# Patient Record
Sex: FEMALE | Race: OTHER | Employment: FULL TIME | URBAN - METROPOLITAN AREA
[De-identification: names, ages, dates, MRNs, and addresses within clinical notes are randomized per-mention and may not be internally consistent; named-entity substitution may affect disease eponyms.]

---

## 2017-08-23 ENCOUNTER — HOSPITAL ENCOUNTER (OUTPATIENT)
Dept: MAMMOGRAPHY | Age: 47
Discharge: HOME | End: 2017-08-23
Attending: OBSTETRICS & GYNECOLOGY
Payer: COMMERCIAL

## 2017-08-23 ENCOUNTER — TELEPHONE (OUTPATIENT)
Dept: OBGYN CLINIC | Facility: CLINIC | Age: 47
End: 2017-08-23

## 2017-08-23 ENCOUNTER — OFFICE VISIT (OUTPATIENT)
Dept: OBGYN CLINIC | Facility: CLINIC | Age: 47
End: 2017-08-23

## 2017-08-23 VITALS
SYSTOLIC BLOOD PRESSURE: 110 MMHG | DIASTOLIC BLOOD PRESSURE: 70 MMHG | BODY MASS INDEX: 31.01 KG/M2 | WEIGHT: 175 LBS | HEIGHT: 63 IN

## 2017-08-23 DIAGNOSIS — Z01.419 WOMEN'S ANNUAL ROUTINE GYNECOLOGICAL EXAMINATION: ICD-10-CM

## 2017-08-23 DIAGNOSIS — Z01.419 WOMEN'S ANNUAL ROUTINE GYNECOLOGICAL EXAMINATION: Primary | ICD-10-CM

## 2017-08-23 PROBLEM — N91.1 AMENORRHEA, SECONDARY: Status: ACTIVE | Noted: 2017-08-23

## 2017-08-23 PROBLEM — Z98.82 HISTORY OF BILATERAL SALINE BREAST IMPLANTS: Status: ACTIVE | Noted: 2017-08-23

## 2017-08-23 PROCEDURE — 87624 HPV HI-RISK TYP POOLED RSLT: CPT | Performed by: OBSTETRICS & GYNECOLOGY

## 2017-08-23 PROCEDURE — 88175 CYTOPATH C/V AUTO FLUID REDO: CPT | Performed by: OBSTETRICS & GYNECOLOGY

## 2017-08-23 PROCEDURE — 99396 PREV VISIT EST AGE 40-64: CPT | Performed by: OBSTETRICS & GYNECOLOGY

## 2017-08-23 PROCEDURE — 77067 SCR MAMMO BI INCL CAD: CPT | Performed by: OBSTETRICS & GYNECOLOGY

## 2017-08-23 RX ORDER — MONTELUKAST SODIUM 10 MG/1
TABLET ORAL
Refills: 1 | COMMUNITY
Start: 2017-07-22 | End: 2018-09-12

## 2017-08-23 NOTE — PROGRESS NOTES
[facility] is here for a checkup. Patient has not had a period since November of last year. She is having hot flashes, vaginal dryness menopausal symptoms. She is coping with these symptoms very well and has no desire to go on hormone replacement therapy.   I history.     Social History       Social History  Social History   Marital status:   Spouse name: N/A    Years of education: N/A  Number of children: N/A     Occupational History  None on file     Social History Main Topics   Smoking status: Never Sm

## 2017-08-24 LAB — HPV I/H RISK 1 DNA SPEC QL NAA+PROBE: NEGATIVE

## 2017-09-05 ENCOUNTER — HOSPITAL ENCOUNTER (OUTPATIENT)
Age: 47
Discharge: HOME | End: 2017-09-05
Attending: FAMILY MEDICINE
Payer: COMMERCIAL

## 2017-09-05 VITALS
RESPIRATION RATE: 22 BRPM | OXYGEN SATURATION: 99 % | TEMPERATURE: 99 F | SYSTOLIC BLOOD PRESSURE: 138 MMHG | HEART RATE: 89 BPM | DIASTOLIC BLOOD PRESSURE: 92 MMHG

## 2017-09-05 DIAGNOSIS — R21 RASH: Primary | ICD-10-CM

## 2017-09-05 PROCEDURE — 99203 OFFICE O/P NEW LOW 30 MIN: CPT

## 2017-09-05 PROCEDURE — 99213 OFFICE O/P EST LOW 20 MIN: CPT

## 2017-09-05 RX ORDER — HYDROXYZINE HYDROCHLORIDE 25 MG/1
25 TABLET, FILM COATED ORAL EVERY 8 HOURS PRN
Qty: 20 TABLET | Refills: 0 | Status: SHIPPED | OUTPATIENT
Start: 2017-09-05 | End: 2017-10-05

## 2017-09-05 RX ORDER — CIPROFLOXACIN 500 MG/1
500 TABLET, FILM COATED ORAL 2 TIMES DAILY
Qty: 14 TABLET | Refills: 0 | Status: SHIPPED | OUTPATIENT
Start: 2017-09-05 | End: 2017-09-12

## 2017-09-05 NOTE — ED INITIAL ASSESSMENT (HPI)
Patient comes in with complaint of a rash to bilateral ankles which is now spreading up both legs. Rash is reddened, raised, itchy. It has been present for the past few days and worsening.

## 2017-09-05 NOTE — ED PROVIDER NOTES
Patient Seen in: [address]    History   Patient presents with:  Rash Skin Problem (integumentary)    Stated Complaint: rash    HPI  65-year-old female presents with a bilateral itchy rash on the lower legs.   Started shruthi rate, regular rhythm and normal heart sounds. Pulmonary/Chest: Effort normal and breath sounds normal. No respiratory distress. She has no wheezes. She has no rales. Skin: Skin is warm.  Rash (b/l ankles and distal shins with papular lesions on erythem

## 2018-03-12 ENCOUNTER — OFFICE VISIT (OUTPATIENT)
Dept: FAMILY MEDICINE CLINIC | Facility: CLINIC | Age: 48
End: 2018-03-12

## 2018-03-12 VITALS
WEIGHT: 178 LBS | HEART RATE: 90 BPM | OXYGEN SATURATION: 98 % | HEIGHT: 62 IN | SYSTOLIC BLOOD PRESSURE: 118 MMHG | DIASTOLIC BLOOD PRESSURE: 76 MMHG | BODY MASS INDEX: 32.76 KG/M2

## 2018-03-12 DIAGNOSIS — F32.A MODERATELY SEVERE DEPRESSION: ICD-10-CM

## 2018-03-12 DIAGNOSIS — R53.83 FATIGUE, UNSPECIFIED TYPE: ICD-10-CM

## 2018-03-12 DIAGNOSIS — R63.5 WEIGHT GAIN: ICD-10-CM

## 2018-03-12 DIAGNOSIS — Z91.89 ENCOUNTER FOR HEPATITIS C VIRUS SCREENING TEST FOR HIGH RISK PATIENT: ICD-10-CM

## 2018-03-12 DIAGNOSIS — F17.200 SMOKING: ICD-10-CM

## 2018-03-12 DIAGNOSIS — Z11.59 ENCOUNTER FOR HEPATITIS C VIRUS SCREENING TEST FOR HIGH RISK PATIENT: ICD-10-CM

## 2018-03-12 DIAGNOSIS — Z00.01 ENCOUNTER FOR ROUTINE ADULT HEALTH EXAMINATION WITH ABNORMAL FINDINGS: Primary | ICD-10-CM

## 2018-03-12 DIAGNOSIS — N95.0 POSTMENOPAUSAL BLEEDING: ICD-10-CM

## 2018-03-12 PROCEDURE — 99386 PREV VISIT NEW AGE 40-64: CPT | Performed by: FAMILY MEDICINE

## 2018-03-12 PROCEDURE — G0438 PPPS, INITIAL VISIT: HCPCS | Performed by: FAMILY MEDICINE

## 2018-03-12 NOTE — PROGRESS NOTES
HPI:   Jaime Kumar is a 49year old female who presents for a complete physical exam.     Previous PCP was Dr. Angelica Randhawa in [place].    Has taken Sertraline 50 mg daily for depression for the past 10 years, which has helped in the past but not working anxiety, no SI or HI            Current Outpatient Prescriptions:  Sertraline HCl 50 MG Oral Tab Take 100 mg by mouth daily.   Disp:  Rfl:    Montelukast Sodium 10 MG Oral Tab  Disp:  Rfl: 1       Aspirin                 Swelling   Past Medical History:   D gynecologist soon   EXTREMITIES: no edema    No results found for: CHOLEST, HDL, LDL, TRIGLY   ASSESSMENT AND PLAN:   Roxanne Wise is a 49year old female who presents for a complete physical exam.  Encounter for routine adult health examination wit year for next WWE or sooner as needed.     Meds & Refills for this Visit:    No prescriptions requested or ordered in this encounter       Imaging & Consults:  HYPNOSIS THERAPY - INTERNAL REFERRAL    Alfredo Leon DO  3/12/2018  12:15 PM

## 2018-03-13 ENCOUNTER — OFFICE VISIT (OUTPATIENT)
Dept: OBGYN CLINIC | Facility: CLINIC | Age: 48
End: 2018-03-13

## 2018-03-13 ENCOUNTER — LAB ENCOUNTER (OUTPATIENT)
Dept: LAB | Facility: REFERENCE LAB | Age: 48
End: 2018-03-13
Attending: OBSTETRICS & GYNECOLOGY
Payer: COMMERCIAL

## 2018-03-13 DIAGNOSIS — N95.0 POSTMENOPAUSAL BLEEDING: ICD-10-CM

## 2018-03-13 DIAGNOSIS — R53.83 FATIGUE, UNSPECIFIED TYPE: ICD-10-CM

## 2018-03-13 DIAGNOSIS — N95.0 POSTMENOPAUSAL BLEEDING: Primary | ICD-10-CM

## 2018-03-13 DIAGNOSIS — Z11.59 ENCOUNTER FOR HEPATITIS C VIRUS SCREENING TEST FOR HIGH RISK PATIENT: ICD-10-CM

## 2018-03-13 DIAGNOSIS — R87.810 CERVICAL HIGH RISK HPV (HUMAN PAPILLOMAVIRUS) TEST POSITIVE: ICD-10-CM

## 2018-03-13 DIAGNOSIS — Z91.89 ENCOUNTER FOR HEPATITIS C VIRUS SCREENING TEST FOR HIGH RISK PATIENT: ICD-10-CM

## 2018-03-13 DIAGNOSIS — Z00.01 ENCOUNTER FOR ROUTINE ADULT HEALTH EXAMINATION WITH ABNORMAL FINDINGS: ICD-10-CM

## 2018-03-13 LAB
ALBUMIN SERPL BCP-MCNC: 4.3 G/DL (ref 3.5–4.8)
ALBUMIN/GLOB SERPL: 1.3 {RATIO} (ref 1–2)
ALP SERPL-CCNC: 84 U/L (ref 32–100)
ALT SERPL-CCNC: 18 U/L (ref 14–54)
ANION GAP SERPL CALC-SCNC: 7 MMOL/L (ref 0–18)
AST SERPL-CCNC: 20 U/L (ref 15–41)
BASOPHILS # BLD: 0 K/UL (ref 0–0.2)
BASOPHILS NFR BLD: 0 %
BILIRUB SERPL-MCNC: 1 MG/DL (ref 0.3–1.2)
BUN SERPL-MCNC: 10 MG/DL (ref 8–20)
BUN/CREAT SERPL: 13.9 (ref 10–20)
CALCIUM SERPL-MCNC: 9.7 MG/DL (ref 8.5–10.5)
CHLORIDE SERPL-SCNC: 104 MMOL/L (ref 95–110)
CHOLEST SERPL-MCNC: 264 MG/DL (ref 110–200)
CO2 SERPL-SCNC: 29 MMOL/L (ref 22–32)
CREAT SERPL-MCNC: 0.72 MG/DL (ref 0.5–1.5)
EOSINOPHIL # BLD: 0.2 K/UL (ref 0–0.7)
EOSINOPHIL NFR BLD: 3 %
ERYTHROCYTE [DISTWIDTH] IN BLOOD BY AUTOMATED COUNT: 12.3 % (ref 11–15)
ESTRADIOL SERPL-MCNC: 119 PG/ML
FSH SERPL-ACNC: 40.2 MIU/ML
GLOBULIN PLAS-MCNC: 3.2 G/DL (ref 2.5–3.7)
GLUCOSE SERPL-MCNC: 95 MG/DL (ref 70–99)
HCT VFR BLD AUTO: 40.8 % (ref 35–48)
HDLC SERPL-MCNC: 51 MG/DL
HGB BLD-MCNC: 14.4 G/DL (ref 12–16)
LDLC SERPL CALC-MCNC: 158 MG/DL (ref 0–99)
LYMPHOCYTES # BLD: 2.4 K/UL (ref 1–4)
LYMPHOCYTES NFR BLD: 28 %
MCH RBC QN AUTO: 31.6 PG (ref 27–32)
MCHC RBC AUTO-ENTMCNC: 35.2 G/DL (ref 32–37)
MCV RBC AUTO: 89.8 FL (ref 80–100)
MONOCYTES # BLD: 0.4 K/UL (ref 0–1)
MONOCYTES NFR BLD: 5 %
NEUTROPHILS # BLD AUTO: 5.4 K/UL (ref 1.8–7.7)
NEUTROPHILS NFR BLD: 64 %
NONHDLC SERPL-MCNC: 213 MG/DL
OSMOLALITY UR CALC.SUM OF ELEC: 289 MOSM/KG (ref 275–295)
PATIENT FASTING: YES
PLATELET # BLD AUTO: 179 K/UL (ref 140–400)
PMV BLD AUTO: 10.7 FL (ref 7.4–10.3)
POTASSIUM SERPL-SCNC: 4.3 MMOL/L (ref 3.3–5.1)
PROT SERPL-MCNC: 7.5 G/DL (ref 5.9–8.4)
RBC # BLD AUTO: 4.54 M/UL (ref 3.7–5.4)
SODIUM SERPL-SCNC: 140 MMOL/L (ref 136–144)
TRIGL SERPL-MCNC: 275 MG/DL (ref 1–149)
TSH SERPL-ACNC: 2.12 UIU/ML (ref 0.45–5.33)
WBC # BLD AUTO: 8.4 K/UL (ref 4–11)

## 2018-03-13 PROCEDURE — 86304 IMMUNOASSAY TUMOR CA 125: CPT

## 2018-03-13 PROCEDURE — 84443 ASSAY THYROID STIM HORMONE: CPT

## 2018-03-13 PROCEDURE — 86803 HEPATITIS C AB TEST: CPT

## 2018-03-13 PROCEDURE — 85025 COMPLETE CBC W/AUTO DIFF WBC: CPT

## 2018-03-13 PROCEDURE — 80061 LIPID PANEL: CPT

## 2018-03-13 PROCEDURE — 82670 ASSAY OF TOTAL ESTRADIOL: CPT

## 2018-03-13 PROCEDURE — 83001 ASSAY OF GONADOTROPIN (FSH): CPT

## 2018-03-13 PROCEDURE — 87624 HPV HI-RISK TYP POOLED RSLT: CPT | Performed by: OBSTETRICS & GYNECOLOGY

## 2018-03-13 PROCEDURE — 36415 COLL VENOUS BLD VENIPUNCTURE: CPT

## 2018-03-13 PROCEDURE — 88175 CYTOPATH C/V AUTO FLUID REDO: CPT | Performed by: OBSTETRICS & GYNECOLOGY

## 2018-03-13 PROCEDURE — 99213 OFFICE O/P EST LOW 20 MIN: CPT | Performed by: OBSTETRICS & GYNECOLOGY

## 2018-03-13 PROCEDURE — 80050 GENERAL HEALTH PANEL: CPT

## 2018-03-13 NOTE — PROGRESS NOTES
Rosendo Oden is here for a checkup. Patient had not had a period since November 2016. She says that she had a period which started on 4 February and lasted to 11 February.   She says that her hot flashes got better and started having menstrual cramp type of symp I performed a Pap smear on her today. Her Pap in 2017 was normal. Pap November 2015 was ASCUS and positive HPV. Patient has had previous LEEP procedure at [facility] many years ago. I would like to see her back again in 1 week.           ORDERS:

## 2018-03-14 ENCOUNTER — TELEPHONE (OUTPATIENT)
Dept: OBGYN CLINIC | Facility: CLINIC | Age: 48
End: 2018-03-14

## 2018-03-14 LAB
CANCER AG125 SERPL-ACNC: 13 U/ML (ref 0–35)
HCV AB SERPL QL IA: NONREACTIVE
HPV I/H RISK 1 DNA SPEC QL NAA+PROBE: NEGATIVE

## 2018-03-14 NOTE — TELEPHONE ENCOUNTER
I called Sydni Clarke regarding her results of CA-125, FSH, estrogen. Patient has appointment to see me following an ultrasound in 1 week.

## 2018-03-19 ENCOUNTER — OFFICE VISIT (OUTPATIENT)
Dept: OBGYN CLINIC | Facility: CLINIC | Age: 48
End: 2018-03-19

## 2018-03-19 ENCOUNTER — ULTRASOUND ENCOUNTER (OUTPATIENT)
Dept: OBGYN CLINIC | Facility: CLINIC | Age: 48
End: 2018-03-19

## 2018-03-19 DIAGNOSIS — N93.9 ABNORMAL UTERINE BLEEDING: Primary | ICD-10-CM

## 2018-03-19 DIAGNOSIS — N95.0 POSTMENOPAUSAL BLEEDING: ICD-10-CM

## 2018-03-19 PROCEDURE — 76856 US EXAM PELVIC COMPLETE: CPT | Performed by: OBSTETRICS & GYNECOLOGY

## 2018-03-19 NOTE — PROGRESS NOTES
Lillian Sharma is here following the ultrasound. Her ultrasound shows 2.2 cm x 1.7 cm cyst in the left ovary. Right ovary appears normal uterus appears normal.    Patient is having pain in the gallbladder area she is going to see Dr. Melissa Melo to have it evaluated.

## 2018-03-23 ENCOUNTER — OFFICE VISIT (OUTPATIENT)
Dept: FAMILY MEDICINE CLINIC | Facility: CLINIC | Age: 48
End: 2018-03-23

## 2018-03-23 ENCOUNTER — HOSPITAL ENCOUNTER (OUTPATIENT)
Dept: ULTRASOUND IMAGING | Age: 48
Discharge: HOME | End: 2018-03-23
Attending: FAMILY MEDICINE
Payer: COMMERCIAL

## 2018-03-23 ENCOUNTER — LAB ENCOUNTER (OUTPATIENT)
Dept: LAB | Facility: REFERENCE LAB | Age: 48
End: 2018-03-23
Attending: FAMILY MEDICINE
Payer: COMMERCIAL

## 2018-03-23 VITALS
OXYGEN SATURATION: 98 % | DIASTOLIC BLOOD PRESSURE: 80 MMHG | HEART RATE: 94 BPM | WEIGHT: 175 LBS | SYSTOLIC BLOOD PRESSURE: 118 MMHG | BODY MASS INDEX: 32.2 KG/M2 | HEIGHT: 62 IN

## 2018-03-23 DIAGNOSIS — R10.11 POSTPRANDIAL RUQ PAIN: ICD-10-CM

## 2018-03-23 DIAGNOSIS — R10.9 FLANK PAIN: ICD-10-CM

## 2018-03-23 DIAGNOSIS — R10.11 POSTPRANDIAL RUQ PAIN: Primary | ICD-10-CM

## 2018-03-23 LAB
BASOPHILS # BLD: 0.1 K/UL (ref 0–0.2)
BASOPHILS NFR BLD: 1 %
BILIRUBIN URINE: NEGATIVE
BLOOD URINE: NEGATIVE
CONTROL RUN WITHIN 24 HOURS?: YES
EOSINOPHIL # BLD: 0.2 K/UL (ref 0–0.7)
EOSINOPHIL NFR BLD: 2 %
ERYTHROCYTE [DISTWIDTH] IN BLOOD BY AUTOMATED COUNT: 12.4 % (ref 11–15)
GLUCOSE URINE: NEGATIVE
HCT VFR BLD AUTO: 39 % (ref 35–48)
HGB BLD-MCNC: 13.8 G/DL (ref 12–16)
KETONE URINE: NEGATIVE
LEUKOCYTE ESTERASE URINE: NEGATIVE
LYMPHOCYTES # BLD: 2.6 K/UL (ref 1–4)
LYMPHOCYTES NFR BLD: 33 %
MCH RBC QN AUTO: 31.5 PG (ref 27–32)
MCHC RBC AUTO-ENTMCNC: 35.4 G/DL (ref 32–37)
MCV RBC AUTO: 89.2 FL (ref 80–100)
MONOCYTES # BLD: 0.5 K/UL (ref 0–1)
MONOCYTES NFR BLD: 6 %
NEUTROPHILS # BLD AUTO: 4.8 K/UL (ref 1.8–7.7)
NEUTROPHILS NFR BLD: 59 %
NITRITE URINE: NEGATIVE
PH URINE: 8.5 (ref 5–8)
PLATELET # BLD AUTO: 182 K/UL (ref 140–400)
PMV BLD AUTO: 10.9 FL (ref 7.4–10.3)
RBC # BLD AUTO: 4.38 M/UL (ref 3.7–5.4)
SPEC GRAVITY: 1.01 (ref 1–1.03)
URINE CLARITY: CLEAR
URINE COLOR: YELLOW
UROBILINOGEN URINE: 0.2
WBC # BLD AUTO: 8.1 K/UL (ref 4–11)

## 2018-03-23 PROCEDURE — 99213 OFFICE O/P EST LOW 20 MIN: CPT | Performed by: FAMILY MEDICINE

## 2018-03-23 PROCEDURE — 85025 COMPLETE CBC W/AUTO DIFF WBC: CPT

## 2018-03-23 PROCEDURE — 76705 ECHO EXAM OF ABDOMEN: CPT | Performed by: FAMILY MEDICINE

## 2018-03-23 PROCEDURE — 36415 COLL VENOUS BLD VENIPUNCTURE: CPT

## 2018-03-23 NOTE — PROGRESS NOTES
CC:  Patient presents with:  Flank Pain: right flank pain, its on and off pain, nauseas, and stool changes       HPI: 49year old female here to discuss intermittent right flank pain, nausea, and stool changes.   Has had nausea off and on for a few months,   Spouse name: N/A    Years of education: N/A  Number of children: N/A     Occupational History  None on file     Social History Main Topics   Smoking status: Current Every Day Smoker  0.25 Packs/day  For 3.00 Years     Smokeless tobacco: Never Used Protein urine Trace Negative   Urobilinogen Urine 0.2 0.2 - 2.0   Nitrite Urine Negative Negative   Leukocyte esterase urine Negative Negative       Assessment and Plan: 49year old female here with diffuse abdominal pain with nausea and post-prandial pa

## 2018-03-24 ENCOUNTER — PATIENT MESSAGE (OUTPATIENT)
Dept: FAMILY MEDICINE CLINIC | Facility: CLINIC | Age: 48
End: 2018-03-24

## 2018-03-24 DIAGNOSIS — K21.9 GASTROESOPHAGEAL REFLUX DISEASE, ESOPHAGITIS PRESENCE NOT SPECIFIED: Primary | ICD-10-CM

## 2018-03-26 ENCOUNTER — TELEPHONE (OUTPATIENT)
Dept: FAMILY MEDICINE CLINIC | Facility: CLINIC | Age: 48
End: 2018-03-26

## 2018-03-26 DIAGNOSIS — R10.30 LOWER ABDOMINAL PAIN: Primary | ICD-10-CM

## 2018-03-26 NOTE — TELEPHONE ENCOUNTER
Patient is returning your call and said she will make sure she keeps her phone by her for the rest of the day

## 2018-03-26 NOTE — TELEPHONE ENCOUNTER
From: Evy Springer  To: Jonathan Kumar DO  Sent: 3/24/2018 12:53 PM CDT  Subject: Visit Carlitos English,  I got your voicemails, and checked my results online as well.   I'm happy those tests came back normal, but I guess that puts u

## 2018-04-20 ENCOUNTER — APPOINTMENT (OUTPATIENT)
Dept: CT IMAGING | Facility: HOSPITAL | Age: 48
End: 2018-04-20
Attending: PHYSICIAN ASSISTANT
Payer: COMMERCIAL

## 2018-04-20 ENCOUNTER — HOSPITAL ENCOUNTER (EMERGENCY)
Facility: HOSPITAL | Age: 48
Discharge: HOME | End: 2018-04-20
Attending: PHYSICIAN ASSISTANT
Payer: COMMERCIAL

## 2018-04-20 VITALS
SYSTOLIC BLOOD PRESSURE: 116 MMHG | HEART RATE: 69 BPM | DIASTOLIC BLOOD PRESSURE: 77 MMHG | OXYGEN SATURATION: 98 % | TEMPERATURE: 97 F | HEIGHT: 62 IN | WEIGHT: 172 LBS | BODY MASS INDEX: 31.65 KG/M2 | RESPIRATION RATE: 18 BRPM

## 2018-04-20 DIAGNOSIS — R11.2 NAUSEA AND VOMITING IN ADULT: ICD-10-CM

## 2018-04-20 DIAGNOSIS — R10.9 RIGHT SIDED ABDOMINAL PAIN: Primary | ICD-10-CM

## 2018-04-20 PROCEDURE — 81003 URINALYSIS AUTO W/O SCOPE: CPT | Performed by: PHYSICIAN ASSISTANT

## 2018-04-20 PROCEDURE — 96361 HYDRATE IV INFUSION ADD-ON: CPT

## 2018-04-20 PROCEDURE — 96374 THER/PROPH/DIAG INJ IV PUSH: CPT

## 2018-04-20 PROCEDURE — 83690 ASSAY OF LIPASE: CPT | Performed by: PHYSICIAN ASSISTANT

## 2018-04-20 PROCEDURE — 80048 BASIC METABOLIC PNL TOTAL CA: CPT | Performed by: PHYSICIAN ASSISTANT

## 2018-04-20 PROCEDURE — 74177 CT ABD & PELVIS W/CONTRAST: CPT | Performed by: PHYSICIAN ASSISTANT

## 2018-04-20 PROCEDURE — 85025 COMPLETE CBC W/AUTO DIFF WBC: CPT | Performed by: PHYSICIAN ASSISTANT

## 2018-04-20 PROCEDURE — 80076 HEPATIC FUNCTION PANEL: CPT | Performed by: PHYSICIAN ASSISTANT

## 2018-04-20 PROCEDURE — 99284 EMERGENCY DEPT VISIT MOD MDM: CPT

## 2018-04-20 PROCEDURE — 96375 TX/PRO/DX INJ NEW DRUG ADDON: CPT

## 2018-04-20 PROCEDURE — 81025 URINE PREGNANCY TEST: CPT

## 2018-04-20 PROCEDURE — S0028 INJECTION, FAMOTIDINE, 20 MG: HCPCS | Performed by: PHYSICIAN ASSISTANT

## 2018-04-20 RX ORDER — FAMOTIDINE 20 MG/1
20 TABLET ORAL 2 TIMES DAILY
Qty: 28 TABLET | Refills: 0 | Status: SHIPPED | OUTPATIENT
Start: 2018-04-20 | End: 2018-05-04

## 2018-04-20 RX ORDER — ONDANSETRON 2 MG/ML
4 INJECTION INTRAMUSCULAR; INTRAVENOUS ONCE
Status: COMPLETED | OUTPATIENT
Start: 2018-04-20 | End: 2018-04-20

## 2018-04-20 RX ORDER — FAMOTIDINE 10 MG/ML
20 INJECTION, SOLUTION INTRAVENOUS ONCE
Status: COMPLETED | OUTPATIENT
Start: 2018-04-20 | End: 2018-04-20

## 2018-04-20 RX ORDER — ONDANSETRON 4 MG/1
4 TABLET, ORALLY DISINTEGRATING ORAL EVERY 6 HOURS PRN
Qty: 10 TABLET | Refills: 0 | Status: SHIPPED | OUTPATIENT
Start: 2018-04-20 | End: 2018-04-23

## 2018-04-20 NOTE — ED PROVIDER NOTES
Patient Seen in: [facility] AND [facility] Emergency Department    History   Patient presents with:  Abdomen/Flank Pain (GI/)    Stated Complaint:     ALBA Reeves is a 49year old female who presents with chief complaint of right abdominal pain. Current Every Day Smoker                                                   Packs/day: 0.25      Years: 3.00      Smokeless tobacco: Never Used                      Alcohol use:  Yes               Review of Systems    Positive for stated complaint:   Other s Musculoskeletal system is grossly intact. There is no obvious deformity. Neurological: Gross motor movement is intact in all 4 extremities. Patient exhibits normal speech. Skin: Skin is normal to inspection. Warm and dry. No obvious bruising.   No obv visualized breast implants. 6. Lesser incidental findings as above.     Dictated by (CST): Nora Mott MD on 4/20/2018 at 14:03     Approved by (CST): Nora Mott MD on 4/20/2018 at 14:11          Physical exam remained stable over serial reexamina

## 2018-04-20 NOTE — ED NOTES
Pt here for flank and abdominal pain x several weeks. Pt states she has seen her PCP Dr. Marcelina Oswald who did and US of her gall bladder which was negative. Pt states she was referred to GI.  Pt continues to have increasing abdominal and flank pain and states she s

## 2018-04-20 NOTE — ED INITIAL ASSESSMENT (HPI)
c/o right sided abdominal pain/flank pain + n/v/d for \"last few weeks\". Last time pt vomited was yesterday morning, ate breakfast this morning.  Denies CP or SOB, denies fevers

## 2018-04-27 ENCOUNTER — TELEPHONE (OUTPATIENT)
Dept: GASTROENTEROLOGY | Facility: CLINIC | Age: 48
End: 2018-04-27

## 2018-04-27 ENCOUNTER — OFFICE VISIT (OUTPATIENT)
Dept: GASTROENTEROLOGY | Facility: CLINIC | Age: 48
End: 2018-04-27

## 2018-04-27 VITALS
WEIGHT: 177 LBS | DIASTOLIC BLOOD PRESSURE: 72 MMHG | HEART RATE: 92 BPM | BODY MASS INDEX: 30.22 KG/M2 | SYSTOLIC BLOOD PRESSURE: 104 MMHG | HEIGHT: 64 IN

## 2018-04-27 DIAGNOSIS — R19.8 CHANGE IN BOWEL FUNCTION: ICD-10-CM

## 2018-04-27 DIAGNOSIS — R10.13 EPIGASTRIC ABDOMINAL PAIN: ICD-10-CM

## 2018-04-27 DIAGNOSIS — R10.13 DYSPEPSIA: Primary | ICD-10-CM

## 2018-04-27 PROCEDURE — 99212 OFFICE O/P EST SF 10 MIN: CPT | Performed by: INTERNAL MEDICINE

## 2018-04-27 PROCEDURE — 99244 OFF/OP CNSLTJ NEW/EST MOD 40: CPT | Performed by: INTERNAL MEDICINE

## 2018-04-27 NOTE — H&P
[address] [facility] [address] Gastroenterology                                                                                                  Clinic History and Physical     Pa Surgical History:  No date: COLPOSCOPY, CERVIX W/UPPER ADJACENT VAGINA; W/*  No date: LEEP  No date: OTHER SURGICAL HISTORY      Comment: COSMETIC SURGERY    Family Hx:   Family History   Problem Relation Age of Onset   • Hepatitis C [OTHER] Father    • St and well perfused   Lung- Moves air well; No labored breathing  Abdomen- soft,mild epigastric ttp, no guarding  Skin- No jaundice  Ext: no cyanosis, clubbing or edema is evident.    Neuro- Alert and interactive, and gross movements of extremities normal intervention [i.e. polypectomy, stent placement, etc.] as indicated. Orders This Visit:  No orders of the defined types were placed in this encounter.       Meds This Visit:    No prescriptions requested or ordered in this encounter    Imaging & Refe

## 2018-04-27 NOTE — TELEPHONE ENCOUNTER
Scheduled for:  EGD 03449  Provider Name: Dr Timothy Garg  Date:   Mon 4/30/18  Location:  [facility]  Sedation:  MAC  Time:  3:00 pm  Prep: Nothing after midnight the day before procedure and NPO 3 hrs prior procedure    Meds/Allergies Reconciled?:  ASA  Diagnosis with

## 2018-04-27 NOTE — PATIENT INSTRUCTIONS
1. Continue pepcid  2. We will call you to set up upper endoscopy (MAC) - dyspepsia/epgiastric pain  3. Washout instructions    -----------------    Clearing fecal retention with Miralax (or Dulcolax Balance) and Gatorade    1.  Buy one 238 gram bottle of M

## 2018-04-27 NOTE — TELEPHONE ENCOUNTER
I called and spoke to pt, pt is aware that [facility] will call her today with arrival time for Monday.

## 2018-04-30 ENCOUNTER — LAB REQUISITION (OUTPATIENT)
Dept: LAB | Facility: HOSPITAL | Age: 48
End: 2018-04-30
Payer: COMMERCIAL

## 2018-04-30 DIAGNOSIS — Z01.89 ENCOUNTER FOR OTHER SPECIFIED SPECIAL EXAMINATIONS: ICD-10-CM

## 2018-04-30 PROCEDURE — 88312 SPECIAL STAINS GROUP 1: CPT | Performed by: INTERNAL MEDICINE

## 2018-04-30 PROCEDURE — 88305 TISSUE EXAM BY PATHOLOGIST: CPT | Performed by: INTERNAL MEDICINE

## 2018-05-03 ENCOUNTER — TELEPHONE (OUTPATIENT)
Dept: GASTROENTEROLOGY | Facility: CLINIC | Age: 48
End: 2018-05-03

## 2018-05-07 ENCOUNTER — TELEPHONE (OUTPATIENT)
Dept: FAMILY MEDICINE CLINIC | Facility: CLINIC | Age: 48
End: 2018-05-07

## 2018-05-07 ENCOUNTER — PATIENT MESSAGE (OUTPATIENT)
Dept: FAMILY MEDICINE CLINIC | Facility: CLINIC | Age: 48
End: 2018-05-07

## 2018-05-07 RX ORDER — SERTRALINE HCL 100 MG
100 TABLET ORAL DAILY
Qty: 90 TABLET | Refills: 0 | OUTPATIENT
Start: 2018-05-07 | End: 2018-08-05

## 2018-05-07 NOTE — TELEPHONE ENCOUNTER
Pt requesting a refill on Zoloft. Pt was last seen by Dr. Nacho Muniz on 3/23//18 and Dr. Nacho Muniz has not given her Zoloft before. Routed msg to Dr. Nacho Muniz.

## 2018-05-07 NOTE — TELEPHONE ENCOUNTER
From: Lois Park  To: Harpal Montiel DO  Sent: 5/7/2018 7:03 AM CDT  Subject: Prescription Question    Goodmorning,   Can you please refill my perscription for Zoloft-(NAME BRAND), please no generic. [place] - [phone].   Thank You!!!

## 2018-05-07 NOTE — TELEPHONE ENCOUNTER
Prescription for Zoloft 100 mg daily called into [place] [id number] pharmacy on file in [facility].

## 2018-05-21 ENCOUNTER — HOSPITAL ENCOUNTER (OUTPATIENT)
Dept: CT IMAGING | Age: 48
Discharge: HOME | End: 2018-05-21
Attending: FAMILY MEDICINE

## 2018-05-21 DIAGNOSIS — Z13.9 SCREENING PROCEDURE: ICD-10-CM

## 2018-05-21 NOTE — PROGRESS NOTES
Pt seen at [facility] for [facility]:  PRELIMINARY SCORE=6.77  BP= 104/72  [facility]ol[facility] labs as follows: Labs from 3/13/18  TC= 264  HDL= 51  LDL= 158  TG= 275  GLUCOSE= 105  All results and risk factors discussed with patient; all questions and concerns addres

## 2018-08-16 RX ORDER — SERTRALINE HCL 100 MG
TABLET ORAL
Qty: 90 TABLET | Refills: 0 | OUTPATIENT
Start: 2018-08-16

## 2018-08-16 NOTE — TELEPHONE ENCOUNTER
Left VM for pt to call back as we'd like to schedule an appmt. ( Refill request received for zoloft and per Dr. Elise Valle on 5/7/18 when she filled med, she stated she'd like to see pt before pt runs out of med).

## 2018-08-16 NOTE — TELEPHONE ENCOUNTER
Pt will call back and schedule f/u with Dr. Phyllis Matamoros and Dr. Jazmin Miller (aware that she needs to see both). Pt only taking 50mg of zoloft and still has 1 RF left.  Will decline online refill request.

## 2018-08-28 ENCOUNTER — TELEPHONE (OUTPATIENT)
Dept: OBGYN CLINIC | Facility: CLINIC | Age: 48
End: 2018-08-28

## 2018-08-30 ENCOUNTER — OFFICE VISIT (OUTPATIENT)
Dept: OBGYN CLINIC | Facility: CLINIC | Age: 48
End: 2018-08-30

## 2018-08-30 VITALS — DIASTOLIC BLOOD PRESSURE: 66 MMHG | SYSTOLIC BLOOD PRESSURE: 112 MMHG

## 2018-08-30 DIAGNOSIS — N83.202 CYST OF LEFT OVARY: ICD-10-CM

## 2018-08-30 DIAGNOSIS — R87.610 ASCUS OF CERVIX WITH NEGATIVE HIGH RISK HPV: Primary | ICD-10-CM

## 2018-08-30 PROCEDURE — 88175 CYTOPATH C/V AUTO FLUID REDO: CPT | Performed by: OBSTETRICS & GYNECOLOGY

## 2018-08-30 PROCEDURE — 99213 OFFICE O/P EST LOW 20 MIN: CPT | Performed by: OBSTETRICS & GYNECOLOGY

## 2018-08-30 PROCEDURE — 87624 HPV HI-RISK TYP POOLED RSLT: CPT | Performed by: OBSTETRICS & GYNECOLOGY

## 2018-08-30 NOTE — PROGRESS NOTES
Josey Stahl is here for a repeat Pap smear. She has not had any vaginal spotting or bleeding at all. Patient tells me that she had colon biopsy at the cervix many years ago at [facility] and she was told that she had mild dysplasia at that time.   Her Pa performed today if it is abnormal I would like to see her soon for colposcopy possible cervical biopsy if it is normal I would like to repeat it again in 6 months.   Also strongly recommended that patient has ultrasound performed in our office within next 2

## 2018-08-31 LAB — HPV I/H RISK 1 DNA SPEC QL NAA+PROBE: NEGATIVE

## 2018-09-12 ENCOUNTER — OFFICE VISIT (OUTPATIENT)
Dept: FAMILY MEDICINE CLINIC | Facility: CLINIC | Age: 48
End: 2018-09-12

## 2018-09-12 VITALS
BODY MASS INDEX: 30.56 KG/M2 | OXYGEN SATURATION: 98 % | WEIGHT: 179 LBS | DIASTOLIC BLOOD PRESSURE: 80 MMHG | SYSTOLIC BLOOD PRESSURE: 120 MMHG | HEART RATE: 83 BPM | HEIGHT: 64 IN

## 2018-09-12 DIAGNOSIS — R19.8 CHANGE IN BOWEL FUNCTION: ICD-10-CM

## 2018-09-12 DIAGNOSIS — M54.50 ACUTE BILATERAL LOW BACK PAIN WITHOUT SCIATICA: Primary | ICD-10-CM

## 2018-09-12 PROCEDURE — 99214 OFFICE O/P EST MOD 30 MIN: CPT | Performed by: FAMILY MEDICINE

## 2018-09-12 RX ORDER — MONTELUKAST SODIUM 10 MG/1
10 TABLET ORAL NIGHTLY
Qty: 90 TABLET | Refills: 2 | Status: SHIPPED | OUTPATIENT
Start: 2018-09-12 | End: 2019-08-08

## 2018-09-12 RX ORDER — METHYLPREDNISOLONE 4 MG/1
TABLET ORAL
Qty: 1 KIT | Refills: 0 | Status: SHIPPED | OUTPATIENT
Start: 2018-09-12 | End: 2019-03-12

## 2018-09-12 NOTE — PATIENT INSTRUCTIONS
Back Exercises: Back Press    Do this exercise on your hands and knees. Keep your knees under your hips and your hands under your shoulders. Keep your spine in a neutral position (not arched or sagging).  Be sure to maintain your neck’s natural curve:  ·

## 2018-09-12 NOTE — PROGRESS NOTES
CC:  Patient presents with:  Low Back Pain: low back pain that radiates to her hips, worse with movement for that month  Other: feels discomfort after she has a bowel movement, started about a  month ago      HPI: 51year old female here for subacute low b Not on file      Number of children: Not on file      Years of education: Not on file      Highest education level: Not on file    Social Needs      Financial resource strain: Not on file      Food insecurity - worry: Not on file      Food insecurity - valerio Soft, positive bowel sounds, mild, diffuse tenderness to palpation, no masses, no guarding, no rebound  Dermatologic:  No rashes or lesions  MSK: Left>right SI joint tenderness, no spinous process tenderness, no ASIS or hip pain, negative straight leg rais

## 2018-09-14 ENCOUNTER — TELEPHONE (OUTPATIENT)
Dept: PHYSICAL THERAPY | Facility: HOSPITAL | Age: 48
End: 2018-09-14

## 2018-09-17 ENCOUNTER — ULTRASOUND ENCOUNTER (OUTPATIENT)
Dept: OBGYN CLINIC | Facility: CLINIC | Age: 48
End: 2018-09-17

## 2018-09-24 ENCOUNTER — TELEPHONE (OUTPATIENT)
Dept: PHYSICAL THERAPY | Facility: HOSPITAL | Age: 48
End: 2018-09-24

## 2018-10-02 ENCOUNTER — TELEPHONE (OUTPATIENT)
Dept: FAMILY MEDICINE CLINIC | Facility: CLINIC | Age: 48
End: 2018-10-02

## 2018-10-02 RX ORDER — SERTRALINE HCL 100 MG
100 TABLET ORAL DAILY
Qty: 90 TABLET | Refills: 2 | Status: SHIPPED | OUTPATIENT
Start: 2018-10-02 | End: 2020-06-12

## 2018-10-02 NOTE — TELEPHONE ENCOUNTER
Pt states sertraline 50 mg was called in for her but was suppose to be switched to zoloft name brand only 100 mg

## 2018-12-12 ENCOUNTER — TELEPHONE (OUTPATIENT)
Dept: OBGYN CLINIC | Facility: CLINIC | Age: 48
End: 2018-12-12

## 2018-12-12 DIAGNOSIS — Z98.82 HISTORY OF BILATERAL SALINE BREAST IMPLANTS: ICD-10-CM

## 2018-12-12 DIAGNOSIS — Z12.39 SCREENING BREAST EXAMINATION: Primary | ICD-10-CM

## 2018-12-12 NOTE — TELEPHONE ENCOUNTER
Patient scheduled mammogram online. Patient scheduled a screening mat augmented. The order doctor place was for a mat screening. Will need order placed for screening mat augmented with acceptable diagnosis.

## 2018-12-12 NOTE — TELEPHONE ENCOUNTER
Test ordered and Ladi Ser notified. Per Maria Guadalupe Harvey in [place] at [address] [place] [address], order Tomow screening bilateral and put in comment section \"augmented'.

## 2018-12-21 ENCOUNTER — HOSPITAL ENCOUNTER (OUTPATIENT)
Dept: MAMMOGRAPHY | Age: 48
Discharge: HOME | End: 2018-12-21
Attending: FAMILY MEDICINE
Payer: COMMERCIAL

## 2018-12-21 DIAGNOSIS — Z98.82 HISTORY OF BILATERAL SALINE BREAST IMPLANTS: ICD-10-CM

## 2018-12-21 DIAGNOSIS — Z12.39 SCREENING BREAST EXAMINATION: ICD-10-CM

## 2018-12-21 PROCEDURE — 77067 SCR MAMMO BI INCL CAD: CPT | Performed by: OBSTETRICS & GYNECOLOGY

## 2018-12-21 PROCEDURE — 77063 BREAST TOMOSYNTHESIS BI: CPT | Performed by: OBSTETRICS & GYNECOLOGY

## 2019-02-26 ENCOUNTER — HOSPITAL ENCOUNTER (OUTPATIENT)
Age: 49
Discharge: HOME | End: 2019-02-26
Attending: EMERGENCY MEDICINE
Payer: COMMERCIAL

## 2019-02-26 VITALS
RESPIRATION RATE: 20 BRPM | BODY MASS INDEX: 32.76 KG/M2 | HEIGHT: 62 IN | SYSTOLIC BLOOD PRESSURE: 122 MMHG | OXYGEN SATURATION: 100 % | HEART RATE: 89 BPM | DIASTOLIC BLOOD PRESSURE: 79 MMHG | WEIGHT: 178 LBS | TEMPERATURE: 98 F

## 2019-02-26 DIAGNOSIS — N30.00 ACUTE CYSTITIS WITHOUT HEMATURIA: Primary | ICD-10-CM

## 2019-02-26 LAB
BILIRUB UR QL STRIP: NEGATIVE
CLARITY UR: CLEAR
COLOR UR: YELLOW
GLUCOSE UR STRIP-MCNC: NEGATIVE MG/DL
HGB UR QL STRIP: NEGATIVE
KETONES UR STRIP-MCNC: NEGATIVE MG/DL
LEUKOCYTE ESTERASE UR QL STRIP: NEGATIVE
NITRITE UR QL STRIP: POSITIVE
PH UR STRIP: 5.5 [PH]
PROT UR STRIP-MCNC: NEGATIVE MG/DL
SP GR UR STRIP: 1.01
UROBILINOGEN UR STRIP-ACNC: <2 MG/DL

## 2019-02-26 PROCEDURE — 99214 OFFICE O/P EST MOD 30 MIN: CPT

## 2019-02-26 PROCEDURE — 51701 INSERT BLADDER CATHETER: CPT

## 2019-02-26 PROCEDURE — 87086 URINE CULTURE/COLONY COUNT: CPT | Performed by: EMERGENCY MEDICINE

## 2019-02-26 PROCEDURE — 81003 URINALYSIS AUTO W/O SCOPE: CPT

## 2019-02-26 RX ORDER — CEPHALEXIN 500 MG/1
500 CAPSULE ORAL 3 TIMES DAILY
Qty: 21 CAPSULE | Refills: 0 | Status: SHIPPED | OUTPATIENT
Start: 2019-02-26 | End: 2019-03-05

## 2019-02-26 NOTE — ED PROVIDER NOTES
Patient Seen in: [address]    History   Patient presents with:  Urinary Symptoms (urologic)    Stated Complaint: PRESSURE IN PELVIC AREA    HPI    The patient is a 50-year-old female with past medical history of kidney Mucous membranes moist  Back: no CVA tenderness  Abdomen: Normal active bowel sounds suprapubic fullness and tenderness without guarding or rebound  Extremities: No edema  Skin: No acute rash  Reflexes: Hyperreflexic and non-extinguishing        ED Course

## 2019-03-12 ENCOUNTER — OFFICE VISIT (OUTPATIENT)
Dept: FAMILY MEDICINE CLINIC | Facility: CLINIC | Age: 49
End: 2019-03-12

## 2019-03-12 VITALS
SYSTOLIC BLOOD PRESSURE: 118 MMHG | DIASTOLIC BLOOD PRESSURE: 74 MMHG | OXYGEN SATURATION: 98 % | WEIGHT: 184 LBS | BODY MASS INDEX: 31.41 KG/M2 | HEIGHT: 64 IN | HEART RATE: 85 BPM

## 2019-03-12 DIAGNOSIS — N39.3 STRESS INCONTINENCE: ICD-10-CM

## 2019-03-12 DIAGNOSIS — R10.2 SUPRAPUBIC PRESSURE: Primary | ICD-10-CM

## 2019-03-12 PROBLEM — E78.2 MIXED HYPERLIPIDEMIA: Status: ACTIVE | Noted: 2019-03-12

## 2019-03-12 PROBLEM — K76.0 HEPATIC STEATOSIS: Status: ACTIVE | Noted: 2019-03-12

## 2019-03-12 PROCEDURE — 99214 OFFICE O/P EST MOD 30 MIN: CPT | Performed by: FAMILY MEDICINE

## 2019-03-12 NOTE — PROGRESS NOTES
CC:  Patient presents with:   Follow - Up: was seen at the immediate care on 2/26/19 for pressure and back pain- was given antibiotics and helped a little       HPI: 51year old female here for immediate care follow-up for suprapubic pressure and low back p file        Non-medical: Not on file    Tobacco Use      Smoking status: Current Every Day Smoker        Packs/day: 0.25        Years: 3.00        Pack years: .76      Smokeless tobacco: Never Used    Substance and Sexual Activity      Alcohol use:  Yes guarding, no rebound, no CVA tenderness   Dermatologic:  No rashes or lesions    Assessment and Plan: 51year old female here for immediate care follow-up for suprapubic pressure that has persisted and with stress incontinence and concerns with incomplete

## 2019-03-12 NOTE — PATIENT INSTRUCTIONS
Start Loulou 500 mg three times a day for menopausal symptoms  -Schedule with urogynecologist and follow-up with me for a physical      What Is Stress Urinary Incontinence?   Stress urinary incontinence is a common type of bladder control problem in women (al from smoking)  · Being overweight or obese  · Hysterectomy or other pelvic surgery  · Nerve damage  Treatment  Different treatments are available for stress incontinence including:  · Lifestyle changes such as reducing weight, quitting smoking, reducing dr

## 2019-04-03 ENCOUNTER — HOSPITAL ENCOUNTER (OUTPATIENT)
Age: 49
Discharge: HOME | End: 2019-04-03
Payer: COMMERCIAL

## 2019-04-03 VITALS
HEART RATE: 80 BPM | BODY MASS INDEX: 31.28 KG/M2 | DIASTOLIC BLOOD PRESSURE: 72 MMHG | SYSTOLIC BLOOD PRESSURE: 128 MMHG | HEIGHT: 62 IN | TEMPERATURE: 98 F | WEIGHT: 170 LBS | OXYGEN SATURATION: 99 % | RESPIRATION RATE: 18 BRPM

## 2019-04-03 DIAGNOSIS — H61.21 IMPACTED CERUMEN OF RIGHT EAR: ICD-10-CM

## 2019-04-03 DIAGNOSIS — H65.91 RIGHT NON-SUPPURATIVE OTITIS MEDIA: Primary | ICD-10-CM

## 2019-04-03 PROCEDURE — 87430 STREP A AG IA: CPT

## 2019-04-03 PROCEDURE — 69209 REMOVE IMPACTED EAR WAX UNI: CPT

## 2019-04-03 PROCEDURE — 99213 OFFICE O/P EST LOW 20 MIN: CPT

## 2019-04-03 PROCEDURE — 99214 OFFICE O/P EST MOD 30 MIN: CPT

## 2019-04-03 RX ORDER — AMOXICILLIN AND CLAVULANATE POTASSIUM 875; 125 MG/1; MG/1
1 TABLET, FILM COATED ORAL 2 TIMES DAILY
Qty: 20 TABLET | Refills: 0 | Status: SHIPPED | OUTPATIENT
Start: 2019-04-03 | End: 2019-04-13

## 2019-04-03 NOTE — ED PROVIDER NOTES
No chief complaint on file. HPI:     Violetta Bain is a 51year old female who presents with a chief complaint of right ear pain that started a few days ago. The patient has seasonal allergies and has been congested.   She states she has had edgard Physical activity:        Days per week: Not on file        Minutes per session: Not on file      Stress: Not on file    Relationships      Social connections:        Talks on phone: Not on file        Gets together: Not on file        Attends [employer] se uvula midline and airway patent. No trismus. LUNGS: clear to auscultation bilaterally; no rales, rhonchi, or wheezes   NEURO: No deficits.     MDM/Assessment/Plan:   Orders for this encounter:    Orders Placed This Encounter      POCT Rapid Strep      Ear

## 2019-04-03 NOTE — ED INITIAL ASSESSMENT (HPI)
REPORTS RIGHT SIDED EAR PAIN, RIGHT SIDED JAW PAIN AS WELL AS RIGHT SIDED SINUS PAIN FOR A FEW DAYS. ALSO C/O RIGHT SIDED LYMPH NODE SWELLING. STATES SHE HAD THE CHILLS LAST NIGHT. ALSO C/O FEELING EXHAUSTED.

## 2019-04-19 ENCOUNTER — HOSPITAL ENCOUNTER (OUTPATIENT)
Age: 49
Discharge: HOME | End: 2019-04-19
Attending: EMERGENCY MEDICINE
Payer: COMMERCIAL

## 2019-04-19 ENCOUNTER — APPOINTMENT (OUTPATIENT)
Dept: GENERAL RADIOLOGY | Age: 49
End: 2019-04-19
Attending: EMERGENCY MEDICINE
Payer: COMMERCIAL

## 2019-04-19 VITALS
DIASTOLIC BLOOD PRESSURE: 79 MMHG | OXYGEN SATURATION: 97 % | SYSTOLIC BLOOD PRESSURE: 120 MMHG | RESPIRATION RATE: 16 BRPM | HEART RATE: 81 BPM | TEMPERATURE: 98 F

## 2019-04-19 DIAGNOSIS — J01.90 ACUTE NON-RECURRENT SINUSITIS, UNSPECIFIED LOCATION: Primary | ICD-10-CM

## 2019-04-19 DIAGNOSIS — J98.01 BRONCHOSPASM: ICD-10-CM

## 2019-04-19 PROCEDURE — 99214 OFFICE O/P EST MOD 30 MIN: CPT

## 2019-04-19 PROCEDURE — 71046 X-RAY EXAM CHEST 2 VIEWS: CPT | Performed by: EMERGENCY MEDICINE

## 2019-04-19 PROCEDURE — 99213 OFFICE O/P EST LOW 20 MIN: CPT

## 2019-04-19 RX ORDER — INHALER, ASSIST DEVICES
SPACER (EA) MISCELLANEOUS
Qty: 1 EACH | Refills: 0 | Status: SHIPPED | OUTPATIENT
Start: 2019-04-19 | End: 2021-07-29

## 2019-04-19 RX ORDER — AZITHROMYCIN 250 MG/1
TABLET, FILM COATED ORAL
Qty: 1 PACKAGE | Refills: 0 | Status: SHIPPED | OUTPATIENT
Start: 2019-04-19 | End: 2019-05-03

## 2019-04-19 RX ORDER — ALBUTEROL SULFATE 90 UG/1
2 AEROSOL, METERED RESPIRATORY (INHALATION) EVERY 4 HOURS PRN
Qty: 1 INHALER | Refills: 0 | Status: SHIPPED | OUTPATIENT
Start: 2019-04-19 | End: 2019-04-29

## 2019-04-19 RX ORDER — FLUTICASONE PROPIONATE 50 MCG
2 SPRAY, SUSPENSION (ML) NASAL DAILY
Qty: 16 G | Refills: 0 | Status: SHIPPED | OUTPATIENT
Start: 2019-04-19 | End: 2019-05-19

## 2019-04-19 NOTE — ED INITIAL ASSESSMENT (HPI)
Pt states having cough and congestion with headache. Pt states having the cough and congestion for about a month. Pt states this started with a right ear infection. Pt placed on augmentin for 10 days.  Pt states now having congestion in the chest. Pt states

## 2019-04-19 NOTE — ED PROVIDER NOTES
Patient Seen in: [address]    History   Patient presents with:  Cough/URI    Stated Complaint: cough congestion head pain    HPI    19-year-old female patient presents her complaining about cough and congestion for abou clear oropharynx. Tenderness of the sinus percussion on the right side  Heart: Regular rate and rhythm, no murmur  Lungs: Normal respiratory effort, diffuse wheezing noted bilaterally. Prolonged expiratory phase. No crackles noted.     Abdomen: Soft,  no

## 2019-05-03 ENCOUNTER — OFFICE VISIT (OUTPATIENT)
Dept: UROLOGY | Facility: HOSPITAL | Age: 49
End: 2019-05-03
Attending: OBSTETRICS & GYNECOLOGY
Payer: COMMERCIAL

## 2019-05-03 VITALS
WEIGHT: 170 LBS | DIASTOLIC BLOOD PRESSURE: 74 MMHG | HEIGHT: 62 IN | SYSTOLIC BLOOD PRESSURE: 118 MMHG | BODY MASS INDEX: 31.28 KG/M2

## 2019-05-03 DIAGNOSIS — R10.2 SUPRAPUBIC PRESSURE: ICD-10-CM

## 2019-05-03 DIAGNOSIS — N39.3 FEMALE STRESS INCONTINENCE: Primary | ICD-10-CM

## 2019-05-03 DIAGNOSIS — M79.10 MYALGIA: ICD-10-CM

## 2019-05-03 DIAGNOSIS — R39.89 SENSATION OF PRESSURE IN BLADDER AREA: ICD-10-CM

## 2019-05-03 PROCEDURE — 81002 URINALYSIS NONAUTO W/O SCOPE: CPT

## 2019-05-03 PROCEDURE — 99201 HC OUTPT EVAL AND MGNT NEW PT LEVEL 1: CPT

## 2019-05-03 NOTE — PROGRESS NOTES
Kayleen Louie,   5/3/2019     Referred by Dr. Trinity Morales    Patient presents with:  Urinary Symptoms (urologic): referred by Dr. Saint Barthel for ELMER, bladder pressure     Biggest bother is bladder pressure    HPI:  +ELMER  Occasional UUI, some urinary urgency Montelukast Sodium 10 MG Oral Tab Take 1 tablet (10 mg total) by mouth nightly.  Disp: 90 tablet Rfl: 2   Spacer/Aero-Holding Chambers (AEROCHAMBER MV) Does not apply Misc Use with inhaler Disp: 1 each Rfl: 0   azithromycin (ZITHROMAX Z-JULIETH) 250 MG Oral T pharmacologic treatments for OAB  Nonsurgical and surgical treatments for Stress Urinary Incontinence  Pelvic muscle rehabilitation including pelvic floor PT  Topical estrogen therapy for treating UGA  Behavioral and pharmacologic treatments for IC/PBS  Rodrigo

## 2019-05-20 ENCOUNTER — OFFICE VISIT (OUTPATIENT)
Dept: OBGYN CLINIC | Facility: CLINIC | Age: 49
End: 2019-05-20

## 2019-05-20 VITALS — SYSTOLIC BLOOD PRESSURE: 108 MMHG | HEIGHT: 62 IN | DIASTOLIC BLOOD PRESSURE: 60 MMHG | BODY MASS INDEX: 31 KG/M2

## 2019-05-20 DIAGNOSIS — R87.610 ASCUS OF CERVIX WITH NEGATIVE HIGH RISK HPV: Primary | ICD-10-CM

## 2019-05-20 PROCEDURE — 88175 CYTOPATH C/V AUTO FLUID REDO: CPT | Performed by: OBSTETRICS & GYNECOLOGY

## 2019-05-20 PROCEDURE — 99213 OFFICE O/P EST LOW 20 MIN: CPT | Performed by: OBSTETRICS & GYNECOLOGY

## 2019-05-20 PROCEDURE — 87624 HPV HI-RISK TYP POOLED RSLT: CPT | Performed by: OBSTETRICS & GYNECOLOGY

## 2019-05-20 NOTE — PROGRESS NOTES
Abril Andrade is here for a checkup. Essentially repeat Pap smear. Patient was evaluated by Dr. Yomi Dominique, GYN urologist and her next appointment is in the chart it says 3 months but patient is telling me 6 months.   Patient says that she is doing pelvic floor ph would like to see her back in the office in 1 year. The plan was discussed with the patient. ORDERS:     No orders of the defined types were placed in this encounter.       PRESCRIPTIONS:     Requested Prescriptions      No prescriptions requested

## 2019-08-09 RX ORDER — MONTELUKAST SODIUM 10 MG/1
TABLET ORAL
Qty: 90 TABLET | Refills: 0 | Status: SHIPPED | OUTPATIENT
Start: 2019-08-09 | End: 2019-11-08

## 2019-08-09 NOTE — TELEPHONE ENCOUNTER
A refill request was received for:  Requested Prescriptions     Pending Prescriptions Disp Refills   • MONTELUKAST SODIUM 10 MG Oral Tab [Pharmacy Med Name: MONTELUKAST 10MG TABLETS] 90 tablet 0     Sig: TAKE 1 TABLET BY MOUTH NIGHTLY     Last refill date:

## 2019-09-03 ENCOUNTER — TELEPHONE (OUTPATIENT)
Dept: UROLOGY | Facility: HOSPITAL | Age: 49
End: 2019-09-03

## 2019-11-08 RX ORDER — MONTELUKAST SODIUM 10 MG/1
TABLET ORAL
Qty: 90 TABLET | Refills: 0 | Status: SHIPPED | OUTPATIENT
Start: 2019-11-08 | End: 2020-02-17

## 2019-11-08 NOTE — TELEPHONE ENCOUNTER
A refill request was received for:  Requested Prescriptions     Pending Prescriptions Disp Refills   • MONTELUKAST SODIUM 10 MG Oral Tab [Pharmacy Med Name: MONTELUKAST 10MG TABLETS] 90 tablet 0     Sig: TAKE 1 TABLET BY MOUTH EVERY NIGHT AT BEDTIME     La

## 2020-01-13 ENCOUNTER — PATIENT OUTREACH (OUTPATIENT)
Dept: FAMILY MEDICINE CLINIC | Facility: CLINIC | Age: 50
End: 2020-01-13

## 2020-02-17 RX ORDER — MONTELUKAST SODIUM 10 MG/1
TABLET ORAL
Qty: 30 TABLET | Refills: 0 | Status: SHIPPED | OUTPATIENT
Start: 2020-02-17 | End: 2020-03-16

## 2020-02-17 RX ORDER — MONTELUKAST SODIUM 10 MG/1
TABLET ORAL
Qty: 90 TABLET | Refills: 0 | OUTPATIENT
Start: 2020-02-17

## 2020-03-16 RX ORDER — MONTELUKAST SODIUM 10 MG/1
TABLET ORAL
Qty: 60 TABLET | Refills: 0 | Status: SHIPPED | OUTPATIENT
Start: 2020-03-16 | End: 2020-05-11

## 2020-05-11 RX ORDER — MONTELUKAST SODIUM 10 MG/1
TABLET ORAL
Qty: 60 TABLET | Refills: 0 | Status: SHIPPED | OUTPATIENT
Start: 2020-05-11 | End: 2020-07-20

## 2020-05-11 NOTE — TELEPHONE ENCOUNTER
A refill request was received for:  Requested Prescriptions     Pending Prescriptions Disp Refills   • MONTELUKAST SODIUM 10 MG Oral Tab [Pharmacy Med Name: MONTELUKAST 10MG TABLETS] 60 tablet 0     Sig: TAKE 1 TABLET BY MOUTH EVERY NIGHT AT BEDTIME     [place]

## 2020-05-12 RX ORDER — MONTELUKAST SODIUM 10 MG/1
TABLET ORAL
Qty: 90 TABLET | Refills: 0 | OUTPATIENT
Start: 2020-05-12

## 2020-06-12 ENCOUNTER — LAB ENCOUNTER (OUTPATIENT)
Dept: LAB | Facility: REFERENCE LAB | Age: 50
End: 2020-06-12
Attending: FAMILY MEDICINE
Payer: COMMERCIAL

## 2020-06-12 ENCOUNTER — OFFICE VISIT (OUTPATIENT)
Dept: FAMILY MEDICINE CLINIC | Facility: CLINIC | Age: 50
End: 2020-06-12

## 2020-06-12 VITALS
BODY MASS INDEX: 34.6 KG/M2 | SYSTOLIC BLOOD PRESSURE: 122 MMHG | OXYGEN SATURATION: 98 % | HEART RATE: 99 BPM | DIASTOLIC BLOOD PRESSURE: 76 MMHG | WEIGHT: 188 LBS | HEIGHT: 62 IN

## 2020-06-12 DIAGNOSIS — N95.1 VASOMOTOR SYMPTOMS DUE TO MENOPAUSE: ICD-10-CM

## 2020-06-12 DIAGNOSIS — Z00.01 ENCOUNTER FOR ROUTINE ADULT HEALTH EXAMINATION WITH ABNORMAL FINDINGS: ICD-10-CM

## 2020-06-12 DIAGNOSIS — R21 RASH AND NONSPECIFIC SKIN ERUPTION: ICD-10-CM

## 2020-06-12 DIAGNOSIS — Z00.01 ENCOUNTER FOR ROUTINE ADULT HEALTH EXAMINATION WITH ABNORMAL FINDINGS: Primary | ICD-10-CM

## 2020-06-12 DIAGNOSIS — Z72.0 TOBACCO USE: ICD-10-CM

## 2020-06-12 DIAGNOSIS — Z12.11 COLON CANCER SCREENING: ICD-10-CM

## 2020-06-12 DIAGNOSIS — Z12.31 SCREENING MAMMOGRAM, ENCOUNTER FOR: ICD-10-CM

## 2020-06-12 PROCEDURE — G0438 PPPS, INITIAL VISIT: HCPCS | Performed by: FAMILY MEDICINE

## 2020-06-12 PROCEDURE — 99396 PREV VISIT EST AGE 40-64: CPT | Performed by: FAMILY MEDICINE

## 2020-06-12 PROCEDURE — 83036 HEMOGLOBIN GLYCOSYLATED A1C: CPT

## 2020-06-12 PROCEDURE — 36415 COLL VENOUS BLD VENIPUNCTURE: CPT

## 2020-06-12 PROCEDURE — 85025 COMPLETE CBC W/AUTO DIFF WBC: CPT

## 2020-06-12 PROCEDURE — 99406 BEHAV CHNG SMOKING 3-10 MIN: CPT | Performed by: FAMILY MEDICINE

## 2020-06-12 PROCEDURE — 80053 COMPREHEN METABOLIC PANEL: CPT

## 2020-06-12 PROCEDURE — 80061 LIPID PANEL: CPT

## 2020-06-12 RX ORDER — SERTRALINE HCL 100 MG
50 TABLET ORAL DAILY
Qty: 90 TABLET | Refills: 1 | COMMUNITY
Start: 2020-06-12 | End: 2020-07-20

## 2020-06-12 RX ORDER — YOHIMBE BARK 500 MG
500 CAPSULE ORAL 3 TIMES DAILY
COMMUNITY
End: 2021-07-29

## 2020-06-12 NOTE — PROGRESS NOTES
HPI:   Luther Arnold is a 47year old female who presents for a complete physical exam.     Last pap: 5/2019 and normal  Last mammogram: 12/2018 and normal     Previous colonoscopy: Never had one before    History of STD's: HPV  History of intimate p COLPOSCOPY, CERVIX W/UPPER ADJACENT VAGINA; W/BIOPSY(S), CERVIX     • LEEP     • LITHOTRIPSY  2016    Kidney stone    • OTHER SURGICAL HISTORY      COSMETIC SURGERY       Family History   Problem Relation Age of Onset   • Other (Hepatitis C) Father    • St (mg/dL)   Date Value   03/13/2018 264 (H)     HDL Cholesterol (mg/dL)   Date Value   03/13/2018 51     LDL Cholesterol (mg/dL)   Date Value   03/13/2018 158 (H)      ASSESSMENT AND PLAN:   Ramana Wagner is a 47year old female who presents for a comp and the patient verbalizes understanding. She will return in one year for next [address] or sooner as needed.     Meds & Refills for this Visit:  Requested Prescriptions      No prescriptions requested or ordered in this encounter       Imaging & Consults:  JORDAN

## 2020-07-16 ENCOUNTER — OFFICE VISIT (OUTPATIENT)
Dept: FAMILY MEDICINE CLINIC | Facility: CLINIC | Age: 50
End: 2020-07-16

## 2020-07-16 VITALS
BODY MASS INDEX: 34.96 KG/M2 | HEIGHT: 62 IN | WEIGHT: 190 LBS | OXYGEN SATURATION: 98 % | TEMPERATURE: 98 F | DIASTOLIC BLOOD PRESSURE: 88 MMHG | HEART RATE: 83 BPM | SYSTOLIC BLOOD PRESSURE: 120 MMHG

## 2020-07-16 DIAGNOSIS — R10.9 RIGHT FLANK PAIN: Primary | ICD-10-CM

## 2020-07-16 DIAGNOSIS — R10.11 RUQ ABDOMINAL PAIN: ICD-10-CM

## 2020-07-16 LAB
APPEARANCE: CLEAR
BILIRUBIN: NEGATIVE
GLUCOSE (URINE DIPSTICK): NEGATIVE MG/DL
KETONES (URINE DIPSTICK): NEGATIVE MG/DL
LEUKOCYTES: NEGATIVE
MULTISTIX LOT#: NORMAL NUMERIC
NITRITE, URINE: NEGATIVE
OCCULT BLOOD: NEGATIVE
PH, URINE: 8 (ref 4.5–8)
PROTEIN (URINE DIPSTICK): NEGATIVE MG/DL
SPECIFIC GRAVITY: 1.02 (ref 1–1.03)
URINE-COLOR: YELLOW
UROBILINOGEN,SEMI-QN: 0.2 MG/DL (ref 0–1.9)

## 2020-07-16 PROCEDURE — 99213 OFFICE O/P EST LOW 20 MIN: CPT | Performed by: FAMILY MEDICINE

## 2020-07-16 PROCEDURE — 3079F DIAST BP 80-89 MM HG: CPT | Performed by: FAMILY MEDICINE

## 2020-07-16 PROCEDURE — 81002 URINALYSIS NONAUTO W/O SCOPE: CPT | Performed by: FAMILY MEDICINE

## 2020-07-16 PROCEDURE — 3074F SYST BP LT 130 MM HG: CPT | Performed by: FAMILY MEDICINE

## 2020-07-16 PROCEDURE — 3008F BODY MASS INDEX DOCD: CPT | Performed by: FAMILY MEDICINE

## 2020-07-16 NOTE — PROGRESS NOTES
HPI:    Patient ID: Alexander Roa is a 45year old female who presents for concerns for kidney stone. HPI  Sx started Monday (4 days ago). Sx include right back & flank pain. Radiates around to RUQ & epigastric area. No radiation to groin.    Toby Torrez       Spouse name: Not on file      Number of children: Not on file      Years of education: Not on file      Highest education level: Not on file    Occupational History      Not on file    Social Needs      Financial resource strain: Not on file 2016    Kidney stone    • OTHER SURGICAL HISTORY      COSMETIC SURGERY        Review of Systems   Constitutional: Positive for appetite change and fatigue. Negative for chills and fever. HENT: Negative. Eyes: Negative.     Respiratory: Negative for co URINALYSIS NONAUTO W/O SCOPE    2. RUQ abdominal pain  - US ABDOMEN COMPLETE (CPT=76700);  Future     -Urine dip today: normal  -Reviewed recent labs from 06/2020, as well as abdominal u/s & CTAP from early 7981.   -Ddx certainly includes nephrolithiasis an

## 2020-07-20 RX ORDER — MONTELUKAST SODIUM 10 MG/1
10 TABLET ORAL NIGHTLY
Qty: 60 TABLET | Refills: 0 | Status: SHIPPED | OUTPATIENT
Start: 2020-07-20 | End: 2020-09-14

## 2020-07-20 RX ORDER — SERTRALINE HCL 100 MG
50 TABLET ORAL DAILY
Qty: 90 TABLET | Refills: 0 | Status: SHIPPED | OUTPATIENT
Start: 2020-07-20 | End: 2020-11-17

## 2020-07-20 NOTE — TELEPHONE ENCOUNTER
A refill request was received for:  Requested Prescriptions     Pending Prescriptions Disp Refills   • Montelukast Sodium 10 MG Oral Tab 60 tablet 0     Sig: Take 1 tablet (10 mg total) by mouth nightly.    • ZOLOFT 100 MG Oral Tab 90 tablet 1     Sig: Take

## 2020-07-21 ENCOUNTER — TELEPHONE (OUTPATIENT)
Dept: GASTROENTEROLOGY | Facility: CLINIC | Age: 50
End: 2020-07-21

## 2020-07-21 ENCOUNTER — APPOINTMENT (OUTPATIENT)
Dept: LAB | Facility: HOSPITAL | Age: 50
End: 2020-07-21
Attending: NURSE PRACTITIONER
Payer: COMMERCIAL

## 2020-07-21 ENCOUNTER — OFFICE VISIT (OUTPATIENT)
Dept: GASTROENTEROLOGY | Facility: CLINIC | Age: 50
End: 2020-07-21

## 2020-07-21 VITALS
RESPIRATION RATE: 16 BRPM | HEART RATE: 95 BPM | DIASTOLIC BLOOD PRESSURE: 87 MMHG | TEMPERATURE: 97 F | SYSTOLIC BLOOD PRESSURE: 126 MMHG | WEIGHT: 190 LBS | HEIGHT: 66 IN | BODY MASS INDEX: 30.53 KG/M2

## 2020-07-21 DIAGNOSIS — R19.4 CHANGE IN BOWEL HABITS: ICD-10-CM

## 2020-07-21 DIAGNOSIS — R19.8 TENESMUS: Primary | ICD-10-CM

## 2020-07-21 DIAGNOSIS — Z12.11 COLON CANCER SCREENING: ICD-10-CM

## 2020-07-21 DIAGNOSIS — R19.8 TENESMUS: ICD-10-CM

## 2020-07-21 DIAGNOSIS — R10.11 RUQ PAIN: ICD-10-CM

## 2020-07-21 DIAGNOSIS — R12 HEARTBURN: ICD-10-CM

## 2020-07-21 DIAGNOSIS — R19.8 BORBORYGMI: ICD-10-CM

## 2020-07-21 DIAGNOSIS — R11.0 NAUSEA: ICD-10-CM

## 2020-07-21 LAB
CRP SERPL-MCNC: 0.42 MG/DL (ref ?–0.3)
ERYTHROCYTE [SEDIMENTATION RATE] IN BLOOD: 19 MM/HR (ref 0–30)
VIT B12 SERPL-MCNC: 682 PG/ML (ref 193–986)

## 2020-07-21 PROCEDURE — 86140 C-REACTIVE PROTEIN: CPT

## 2020-07-21 PROCEDURE — 99214 OFFICE O/P EST MOD 30 MIN: CPT | Performed by: NURSE PRACTITIONER

## 2020-07-21 PROCEDURE — 82607 VITAMIN B-12: CPT

## 2020-07-21 PROCEDURE — 36415 COLL VENOUS BLD VENIPUNCTURE: CPT

## 2020-07-21 PROCEDURE — 3008F BODY MASS INDEX DOCD: CPT | Performed by: NURSE PRACTITIONER

## 2020-07-21 PROCEDURE — 3074F SYST BP LT 130 MM HG: CPT | Performed by: NURSE PRACTITIONER

## 2020-07-21 PROCEDURE — 85652 RBC SED RATE AUTOMATED: CPT

## 2020-07-21 PROCEDURE — 3079F DIAST BP 80-89 MM HG: CPT | Performed by: NURSE PRACTITIONER

## 2020-07-21 PROCEDURE — 82306 VITAMIN D 25 HYDROXY: CPT

## 2020-07-21 RX ORDER — MONTELUKAST SODIUM 10 MG/1
10 TABLET ORAL NIGHTLY
Qty: 60 TABLET | Refills: 0 | OUTPATIENT
Start: 2020-07-21

## 2020-07-21 RX ORDER — MONTELUKAST SODIUM 10 MG/1
TABLET ORAL
Qty: 90 TABLET | Refills: 0 | OUTPATIENT
Start: 2020-07-21

## 2020-07-21 RX ORDER — SERTRALINE HCL 100 MG
TABLET ORAL
Qty: 120 TABLET | Refills: 0 | OUTPATIENT
Start: 2020-07-21

## 2020-07-21 RX ORDER — SERTRALINE HCL 100 MG
50 TABLET ORAL DAILY
Qty: 90 TABLET | Refills: 1 | OUTPATIENT
Start: 2020-07-21

## 2020-07-21 RX ORDER — POLYETHYLENE GLYCOL 3350, SODIUM CHLORIDE, SODIUM BICARBONATE, POTASSIUM CHLORIDE 420; 11.2; 5.72; 1.48 G/4L; G/4L; G/4L; G/4L
POWDER, FOR SOLUTION ORAL
Qty: 1 BOTTLE | Refills: 0 | Status: SHIPPED | OUTPATIENT
Start: 2020-07-21 | End: 2021-07-29

## 2020-07-21 NOTE — TELEPHONE ENCOUNTER
1. Schedule colonoscopy with Dr. Arvin no/ SHANNON [Diagnosis: CRC screening]     2.  bowel prep from pharmacy (split trilyte)     3.  Continue all medications for procedure     4. Read all bowel prep instructions carefully     5. AVOID seeds, nuts, popco

## 2020-07-21 NOTE — PATIENT INSTRUCTIONS
-benefiber once daily and squatty potty  -u/s abdomen  -start pepcid 20 mg twice daily  -labs    1. Schedule colonoscopy with Dr. Arti no/ SHANNON [Diagnosis: CRC screening]    2.  bowel prep from pharmacy (split trilyte)    3.  Continue all medications

## 2020-07-21 NOTE — H&P
[address] [facility] [address] Gastroenterology                                                                                                               Reason for consult: f EGD: 4/2018 and biopsies of duodenum and stomach unremarkable    Wt Readings from Last 6 Encounters:  07/21/20 : 190 lb (86.2 kg)  07/16/20 : 190 lb (86.2 kg)  06/12/20 : 188 lb (85.3 kg)  05/03/19 : 170 lb (77.1 kg)  04/03/19 : 170 lb (77.1 kg)  03/12/19 Tab Take 1 tablet by mouth daily.      • Spacer/Aero-Holding Chambers (AEROCHAMBER MV) Does not apply Misc Use with inhaler 1 each 0       Allergies:    Aspirin                 SWELLING    ROS:   CONSTITUTIONAL: negative for fevers, chills, sweats and weigh Absolute 5.04 1.50 - 7.70 x10(3) uL    Lymphocyte Absolute 2.68 1.00 - 4.00 x10(3) uL    Monocyte Absolute 0.44 0.10 - 1.00 x10(3) uL    Eosinophil Absolute 0.24 0.00 - 0.70 x10(3) uL    Basophil Absolute 0.03 0.00 - 0.20 x10(3) uL    Immature Granulocyte duodenitis, increased intraepithelial lymphocytes, polyps, epithelial dysplasia, or malignancy identified.   · No evidence of tissue-invasive fungal organisms, parasitic organisms, or bacterial organisms consistent with Helicobacter species seen on Giemsa s inflammatory etiology.  -labs below  -benefiber squatty potty  1. Schedule colonoscopy with Dr. Isabella Felty w/ SHANNON [Diagnosis: CRC screening]    2.  bowel prep from pharmacy (split trilyte)    3. Continue all medications for procedure    4.  Read all bowel these errors have been corrected.  While every attempt is made to correct errors during dictation, discrepancies may still exist.

## 2020-07-22 ENCOUNTER — HOSPITAL ENCOUNTER (OUTPATIENT)
Dept: MAMMOGRAPHY | Age: 50
Discharge: HOME | End: 2020-07-22
Attending: FAMILY MEDICINE
Payer: COMMERCIAL

## 2020-07-22 ENCOUNTER — OFFICE VISIT (OUTPATIENT)
Dept: OBGYN CLINIC | Facility: CLINIC | Age: 50
End: 2020-07-22

## 2020-07-22 ENCOUNTER — TELEPHONE (OUTPATIENT)
Dept: FAMILY MEDICINE CLINIC | Facility: CLINIC | Age: 50
End: 2020-07-22

## 2020-07-22 VITALS
BODY MASS INDEX: 30.37 KG/M2 | HEIGHT: 66 IN | SYSTOLIC BLOOD PRESSURE: 118 MMHG | WEIGHT: 189 LBS | DIASTOLIC BLOOD PRESSURE: 74 MMHG

## 2020-07-22 DIAGNOSIS — Z01.419 WOMEN'S ANNUAL ROUTINE GYNECOLOGICAL EXAMINATION: Primary | ICD-10-CM

## 2020-07-22 DIAGNOSIS — Z12.31 SCREENING MAMMOGRAM, ENCOUNTER FOR: ICD-10-CM

## 2020-07-22 LAB — 25(OH)D3 SERPL-MCNC: 34.5 NG/ML (ref 30–100)

## 2020-07-22 PROCEDURE — 3078F DIAST BP <80 MM HG: CPT | Performed by: OBSTETRICS & GYNECOLOGY

## 2020-07-22 PROCEDURE — 87624 HPV HI-RISK TYP POOLED RSLT: CPT | Performed by: OBSTETRICS & GYNECOLOGY

## 2020-07-22 PROCEDURE — 77063 BREAST TOMOSYNTHESIS BI: CPT | Performed by: FAMILY MEDICINE

## 2020-07-22 PROCEDURE — 3008F BODY MASS INDEX DOCD: CPT | Performed by: OBSTETRICS & GYNECOLOGY

## 2020-07-22 PROCEDURE — 3074F SYST BP LT 130 MM HG: CPT | Performed by: OBSTETRICS & GYNECOLOGY

## 2020-07-22 PROCEDURE — 99396 PREV VISIT EST AGE 40-64: CPT | Performed by: OBSTETRICS & GYNECOLOGY

## 2020-07-22 PROCEDURE — 88175 CYTOPATH C/V AUTO FLUID REDO: CPT | Performed by: OBSTETRICS & GYNECOLOGY

## 2020-07-22 PROCEDURE — 77067 SCR MAMMO BI INCL CAD: CPT | Performed by: FAMILY MEDICINE

## 2020-07-22 NOTE — PROGRESS NOTES
Jermain Cecy is here for a checkup. She has no gynecologic complaints. Patient was seen by Dr. Keesha Page for right upper quadrant pain and she is scheduled to have gallbladder ultrasound soon. Patient had mammogram today.     On examination:  Carmine Reeves 26 by mouth 3 (three) times daily. • Multiple Vitamins-Minerals (MULTIVITAL) Oral Tab Take 1 tablet by mouth daily.      • Spacer/Aero-Holding Chambers (AEROCHAMBER MV) Does not apply Misc Use with inhaler 1 each 0       Family History     Family History or organizations: Not on file        Relationship status: Not on file      Intimate partner violence:        Fear of current or ex partner: Not on file        Emotionally abused: Not on file        Physically abused: Not on file        Forced sexual [place]

## 2020-07-22 NOTE — TELEPHONE ENCOUNTER
Prior authorization was denied for brand name Zoloft alternative is sertraline.  Please send new script

## 2020-07-23 LAB — HPV I/H RISK 1 DNA SPEC QL NAA+PROBE: NEGATIVE

## 2020-07-27 NOTE — TELEPHONE ENCOUNTER
Patient contacted--informed that Mir Ventura has already sent message to GI schedulers, but that there is a large backlog due to COVID 19 and that it may be 2-3 weeks before GI Schedulers call her. I see message sent 7/21 to schedulers.  Patient states underst

## 2020-07-29 ENCOUNTER — HOSPITAL ENCOUNTER (OUTPATIENT)
Dept: ULTRASOUND IMAGING | Age: 50
Discharge: HOME | End: 2020-07-29
Attending: FAMILY MEDICINE
Payer: COMMERCIAL

## 2020-07-29 DIAGNOSIS — R10.11 RUQ ABDOMINAL PAIN: ICD-10-CM

## 2020-07-29 DIAGNOSIS — R10.9 RIGHT FLANK PAIN: ICD-10-CM

## 2020-07-29 PROCEDURE — 76700 US EXAM ABDOM COMPLETE: CPT | Performed by: FAMILY MEDICINE

## 2020-09-14 RX ORDER — MONTELUKAST SODIUM 10 MG/1
10 TABLET ORAL NIGHTLY
Qty: 60 TABLET | Refills: 0 | Status: SHIPPED | OUTPATIENT
Start: 2020-09-14 | End: 2020-09-15

## 2020-09-14 NOTE — TELEPHONE ENCOUNTER
A refill request was received for:  Requested Prescriptions     Pending Prescriptions Disp Refills   • Montelukast Sodium 10 MG Oral Tab 60 tablet 0     Sig: Take 1 tablet (10 mg total) by mouth nightly.      Last refill date:  07/20/2020  Qty: 60  Last off

## 2020-09-15 ENCOUNTER — TELEPHONE (OUTPATIENT)
Dept: FAMILY MEDICINE CLINIC | Facility: CLINIC | Age: 50
End: 2020-09-15

## 2020-09-15 RX ORDER — MONTELUKAST SODIUM 10 MG/1
10 TABLET ORAL NIGHTLY
Qty: 90 TABLET | Refills: 3 | Status: SHIPPED | OUTPATIENT
Start: 2020-09-15 | End: 2021-09-21

## 2020-09-29 NOTE — TELEPHONE ENCOUNTER
I called and left the 3rd voicemail message to call back to schedule her CLN w/ Dr Jeff Randall. I sent MyChart message and mailed no response letter to patient.

## 2020-11-17 RX ORDER — SERTRALINE HCL 100 MG
50 TABLET ORAL DAILY
Qty: 90 TABLET | Refills: 1 | Status: SHIPPED | OUTPATIENT
Start: 2020-11-17 | End: 2021-08-14

## 2020-11-17 NOTE — TELEPHONE ENCOUNTER
A refill request was received for:  Requested Prescriptions     Pending Prescriptions Disp Refills   • ZOLOFT 100 MG Oral Tab 90 tablet 0     Sig: Take 0.5 tablets (50 mg total) by mouth daily.      Last refill date: 07/20/2020  Qty: 90  Last office visit:

## 2021-02-22 ENCOUNTER — TELEPHONE (OUTPATIENT)
Dept: FAMILY MEDICINE CLINIC | Facility: CLINIC | Age: 51
End: 2021-02-22

## 2021-02-22 ENCOUNTER — HOSPITAL ENCOUNTER (OUTPATIENT)
Age: 51
Discharge: HOME | End: 2021-02-22
Payer: COMMERCIAL

## 2021-02-22 VITALS
OXYGEN SATURATION: 98 % | WEIGHT: 170 LBS | BODY MASS INDEX: 31.28 KG/M2 | TEMPERATURE: 97 F | HEIGHT: 62 IN | DIASTOLIC BLOOD PRESSURE: 78 MMHG | HEART RATE: 98 BPM | RESPIRATION RATE: 18 BRPM | SYSTOLIC BLOOD PRESSURE: 107 MMHG

## 2021-02-22 DIAGNOSIS — H00.014 HORDEOLUM OF LEFT UPPER EYELID, UNSPECIFIED HORDEOLUM TYPE: ICD-10-CM

## 2021-02-22 DIAGNOSIS — H01.004 BLEPHARITIS OF LEFT UPPER EYELID, UNSPECIFIED TYPE: Primary | ICD-10-CM

## 2021-02-22 PROCEDURE — 99213 OFFICE O/P EST LOW 20 MIN: CPT | Performed by: NURSE PRACTITIONER

## 2021-02-22 RX ORDER — ERYTHROMYCIN 5 MG/G
1 OINTMENT OPHTHALMIC EVERY 6 HOURS
Qty: 1 G | Refills: 0 | Status: SHIPPED | OUTPATIENT
Start: 2021-02-22 | End: 2021-03-01

## 2021-02-22 NOTE — TELEPHONE ENCOUNTER
RN returning pt's call. Pt reports having left eye pain start on Friday night, reports it worsening over weekend. Pt denies a stye, states the corner appears bruised, denies any recent trauma or injury.  Pt also reports significant swelling that started on

## 2021-02-22 NOTE — ED INITIAL ASSESSMENT (HPI)
Pt states that LEFT eye started getting swollen to her eyelid on Saturday and progressively worse. States it feels heavy on her eyelid.

## 2021-02-22 NOTE — TELEPHONE ENCOUNTER
Patient called to say her left eye is swollen, itchy, and has pain when she touches upper lid. Eye feels like if she has sand in her eye. This morning had crust. The lower eye lid is starting to swell as well.

## 2021-02-22 NOTE — ED PROVIDER NOTES
Patient Seen in: [facility]      History   Patient presents with: Eye Visual Problem    Stated Complaint: swollen left eye    HPI/Subjective:   HPI    This is a 29-year-old female presenting with left upper eyelid discomfort.   Patient state Temp 96.9 °F (36.1 °C)   Temp src Temporal   SpO2 98 %   O2 Device None (Room air)       Current:/78   Pulse 98   Temp 96.9 °F (36.1 °C) (Temporal)   Resp 18   Ht 157.5 cm (5' 2\")   Wt 77.1 kg   LMP 02/05/2018 (Exact Date)   SpO2 98%   BMI 31.09 kg/ provided. Instructed to follow-up with PCP if she needs a referral due to insurance to ophthalmologist.  Discussed strict go to ER precautions with any new or worsening symptoms.   All education and instructions discussed with the patient placed in dischar

## 2021-02-22 NOTE — TELEPHONE ENCOUNTER
Pt needing referral for ophthalmologist following IC visit. They suggested Dr Sandra Wolf. Rn to route to Dr Edgardo Raya.

## 2021-02-22 NOTE — TELEPHONE ENCOUNTER
Referral faxed to Dr. Claudine Locke office. Called pt and provided Dr. Claudine Locke office number to call and verify receipt of fax. Pt verbalized understanding.

## 2021-03-31 ENCOUNTER — HOSPITAL ENCOUNTER (OUTPATIENT)
Age: 51
Discharge: HOME | End: 2021-03-31
Payer: COMMERCIAL

## 2021-03-31 ENCOUNTER — APPOINTMENT (OUTPATIENT)
Dept: GENERAL RADIOLOGY | Age: 51
End: 2021-03-31
Attending: NURSE PRACTITIONER
Payer: COMMERCIAL

## 2021-03-31 VITALS
SYSTOLIC BLOOD PRESSURE: 126 MMHG | OXYGEN SATURATION: 100 % | BODY MASS INDEX: 32.2 KG/M2 | DIASTOLIC BLOOD PRESSURE: 80 MMHG | WEIGHT: 175 LBS | TEMPERATURE: 98 F | HEIGHT: 62 IN | RESPIRATION RATE: 18 BRPM | HEART RATE: 83 BPM

## 2021-03-31 DIAGNOSIS — T14.8XXA FOREIGN BODY IN SKIN: ICD-10-CM

## 2021-03-31 DIAGNOSIS — M79.644 THUMB PAIN, RIGHT: Primary | ICD-10-CM

## 2021-03-31 PROCEDURE — 73140 X-RAY EXAM OF FINGER(S): CPT | Performed by: NURSE PRACTITIONER

## 2021-03-31 PROCEDURE — 99214 OFFICE O/P EST MOD 30 MIN: CPT | Performed by: NURSE PRACTITIONER

## 2021-03-31 RX ORDER — AMOXICILLIN AND CLAVULANATE POTASSIUM 875; 125 MG/1; MG/1
1 TABLET, FILM COATED ORAL 2 TIMES DAILY
Qty: 14 TABLET | Refills: 0 | Status: SHIPPED | OUTPATIENT
Start: 2021-03-31 | End: 2021-04-07

## 2021-03-31 NOTE — ED PROVIDER NOTES
Patient Seen in: [facility]      History   Patient presents with:  Finger Pain    Stated Complaint: PAIN AND SWELLING RT THUMB    HPI/Subjective:   Well-appearing 48-year-old female presents with complaints of right thumb swelling for the pa 126/80   Pulse 83   Resp 18   Temp 97.6 °F (36.4 °C)   Temp src Temporal   SpO2 100 %   O2 Device None (Room air)       Current:/80   Pulse 83   Temp 97.6 °F (36.4 °C) (Temporal)   Resp 18   Ht 157.5 cm (5' 2\")   Wt 79.4 kg   LMP 02/05/2018 (Exact D [facility] [place] [address]  [phone]    Schedule an appointment as soon as possible for a visit in 2 days            Medications Prescribed:  Discharge Medication List as of 3/31/2021  2:40 PM    START taking these medications    Amoxicillin-Pot Clavulanate

## 2021-03-31 NOTE — ED INITIAL ASSESSMENT (HPI)
Per pt having right thumb pain reports possible splint since last week. Noticed redness and pain for a couple of days.

## 2021-07-29 ENCOUNTER — LABORATORY ENCOUNTER (OUTPATIENT)
Dept: LAB | Facility: REFERENCE LAB | Age: 51
End: 2021-07-29
Attending: FAMILY MEDICINE
Payer: COMMERCIAL

## 2021-07-29 ENCOUNTER — OFFICE VISIT (OUTPATIENT)
Dept: FAMILY MEDICINE CLINIC | Facility: CLINIC | Age: 51
End: 2021-07-29

## 2021-07-29 VITALS
SYSTOLIC BLOOD PRESSURE: 118 MMHG | DIASTOLIC BLOOD PRESSURE: 72 MMHG | HEIGHT: 62 IN | OXYGEN SATURATION: 97 % | HEART RATE: 81 BPM | WEIGHT: 184 LBS | BODY MASS INDEX: 33.86 KG/M2

## 2021-07-29 DIAGNOSIS — L30.4 INTERTRIGO: ICD-10-CM

## 2021-07-29 DIAGNOSIS — L98.9 SKIN LESION OF LEFT LOWER EXTREMITY: Primary | ICD-10-CM

## 2021-07-29 DIAGNOSIS — R74.8 ELEVATED ALKALINE PHOSPHATASE MEASUREMENT: ICD-10-CM

## 2021-07-29 DIAGNOSIS — Z00.00 ROUTINE GENERAL MEDICAL EXAMINATION AT A HEALTH CARE FACILITY: ICD-10-CM

## 2021-07-29 DIAGNOSIS — Z12.11 COLON CANCER SCREENING: ICD-10-CM

## 2021-07-29 DIAGNOSIS — G56.03 BILATERAL CARPAL TUNNEL SYNDROME: ICD-10-CM

## 2021-07-29 DIAGNOSIS — Z12.31 SCREENING MAMMOGRAM, ENCOUNTER FOR: ICD-10-CM

## 2021-07-29 LAB
ALBUMIN SERPL-MCNC: 4.1 G/DL (ref 3.4–5)
ALBUMIN/GLOB SERPL: 1 {RATIO} (ref 1–2)
ALP LIVER SERPL-CCNC: 118 U/L
ALT SERPL-CCNC: 29 U/L
ANION GAP SERPL CALC-SCNC: 7 MMOL/L (ref 0–18)
AST SERPL-CCNC: 20 U/L (ref 15–37)
BASOPHILS # BLD AUTO: 0.06 X10(3) UL (ref 0–0.2)
BASOPHILS NFR BLD AUTO: 0.7 %
BILIRUB SERPL-MCNC: 0.6 MG/DL (ref 0.1–2)
BUN BLD-MCNC: 13 MG/DL (ref 7–18)
BUN/CREAT SERPL: 18.3 (ref 10–20)
CALCIUM BLD-MCNC: 9.2 MG/DL (ref 8.5–10.1)
CHLORIDE SERPL-SCNC: 107 MMOL/L (ref 98–112)
CHOLEST SMN-MCNC: 312 MG/DL (ref ?–200)
CO2 SERPL-SCNC: 26 MMOL/L (ref 21–32)
CREAT BLD-MCNC: 0.71 MG/DL
DEPRECATED RDW RBC AUTO: 39.3 FL (ref 35.1–46.3)
EOSINOPHIL # BLD AUTO: 0.24 X10(3) UL (ref 0–0.7)
EOSINOPHIL NFR BLD AUTO: 2.6 %
ERYTHROCYTE [DISTWIDTH] IN BLOOD BY AUTOMATED COUNT: 12.2 % (ref 11–15)
EST. AVERAGE GLUCOSE BLD GHB EST-MCNC: 111 MG/DL (ref 68–126)
GLOBULIN PLAS-MCNC: 4.1 G/DL (ref 2.8–4.4)
GLUCOSE BLD-MCNC: 101 MG/DL (ref 70–99)
HBA1C MFR BLD HPLC: 5.5 % (ref ?–5.7)
HCT VFR BLD AUTO: 41.3 %
HDLC SERPL-MCNC: 36 MG/DL (ref 40–59)
HGB BLD-MCNC: 14.2 G/DL
IMM GRANULOCYTES # BLD AUTO: 0.03 X10(3) UL (ref 0–1)
IMM GRANULOCYTES NFR BLD: 0.3 %
LDLC SERPL CALC-MCNC: 187 MG/DL (ref ?–100)
LYMPHOCYTES # BLD AUTO: 3.05 X10(3) UL (ref 1–4)
LYMPHOCYTES NFR BLD AUTO: 33.3 %
M PROTEIN MFR SERPL ELPH: 8.2 G/DL (ref 6.4–8.2)
MCH RBC QN AUTO: 30.3 PG (ref 26–34)
MCHC RBC AUTO-ENTMCNC: 34.4 G/DL (ref 31–37)
MCV RBC AUTO: 88.1 FL
MONOCYTES # BLD AUTO: 0.53 X10(3) UL (ref 0.1–1)
MONOCYTES NFR BLD AUTO: 5.8 %
NEUTROPHILS # BLD AUTO: 5.26 X10 (3) UL (ref 1.5–7.7)
NEUTROPHILS # BLD AUTO: 5.26 X10(3) UL (ref 1.5–7.7)
NEUTROPHILS NFR BLD AUTO: 57.3 %
NONHDLC SERPL-MCNC: 276 MG/DL (ref ?–130)
OSMOLALITY SERPL CALC.SUM OF ELEC: 290 MOSM/KG (ref 275–295)
PATIENT FASTING Y/N/NP: NO
PATIENT FASTING Y/N/NP: NO
PLATELET # BLD AUTO: 192 10(3)UL (ref 150–450)
POTASSIUM SERPL-SCNC: 3.9 MMOL/L (ref 3.5–5.1)
RBC # BLD AUTO: 4.69 X10(6)UL
SODIUM SERPL-SCNC: 140 MMOL/L (ref 136–145)
TRIGL SERPL-MCNC: 437 MG/DL (ref 30–149)
VIT B12 SERPL-MCNC: 695 PG/ML (ref 193–986)
VLDLC SERPL CALC-MCNC: 96 MG/DL (ref 0–30)
WBC # BLD AUTO: 9.2 X10(3) UL (ref 4–11)

## 2021-07-29 PROCEDURE — 80061 LIPID PANEL: CPT

## 2021-07-29 PROCEDURE — 3008F BODY MASS INDEX DOCD: CPT | Performed by: FAMILY MEDICINE

## 2021-07-29 PROCEDURE — 80053 COMPREHEN METABOLIC PANEL: CPT

## 2021-07-29 PROCEDURE — 82977 ASSAY OF GGT: CPT

## 2021-07-29 PROCEDURE — 99214 OFFICE O/P EST MOD 30 MIN: CPT | Performed by: FAMILY MEDICINE

## 2021-07-29 PROCEDURE — 3074F SYST BP LT 130 MM HG: CPT | Performed by: FAMILY MEDICINE

## 2021-07-29 PROCEDURE — 85025 COMPLETE CBC W/AUTO DIFF WBC: CPT

## 2021-07-29 PROCEDURE — 86803 HEPATITIS C AB TEST: CPT

## 2021-07-29 PROCEDURE — 36415 COLL VENOUS BLD VENIPUNCTURE: CPT

## 2021-07-29 PROCEDURE — 3078F DIAST BP <80 MM HG: CPT | Performed by: FAMILY MEDICINE

## 2021-07-29 PROCEDURE — 83036 HEMOGLOBIN GLYCOSYLATED A1C: CPT

## 2021-07-29 PROCEDURE — 82607 VITAMIN B-12: CPT

## 2021-07-29 RX ORDER — CLOTRIMAZOLE AND BETAMETHASONE DIPROPIONATE 10; .64 MG/G; MG/G
1 CREAM TOPICAL 2 TIMES DAILY
Qty: 60 G | Refills: 1 | Status: SHIPPED | OUTPATIENT
Start: 2021-07-29

## 2021-07-29 NOTE — PROGRESS NOTES
CC:  Patient presents with:  Growth: back of her left calf that sometimes opens and bleeds very bad      HPI: 45year old female here with multiple concerns. Has had an itchy rash in between and under her breasts for a few months.   Has tried Monistat wit Cigarettes      Smokeless tobacco: Never Used    Vaping Use      Vaping Use: Never used    Substance and Sexual Activity      Alcohol use:  Yes        Alcohol/week: 0.0 standard drinks        Comment: SOCIALLY      Drug use: No      Sexual activity: Yes Alert, appropriate, no acute distress   HEENT: supple, no lymphadenopathy   Breasts: no abnormal lumps or masses, no nipple discharge, no skin changes, no axillary lymphadenopathy   Cardio:  RRR, no murmurs, S1, S2  Pulmonary:  Clear bilaterally, good air (LON=68611); Future    6. Routine general medical examination at a health care facility    - Check labs prior to returning for annual physical   - HCV ANTIBODY; Future  - LIPID PANEL;  Future  - HEMOGLOBIN A1C; Future  - CBC WITH DIFFERENTIAL WITH PLATELET;

## 2021-07-29 NOTE — PATIENT INSTRUCTIONS
Carpal Tunnel Syndrome    Carpal tunnel syndrome is a painful condition of the wrist and arm. It is caused by pressure on the median nerve. The median nerve is one of the nerves that give feeling and movement to the hand.  It passes through a tunnel in th talk with your healthcare provider before using these medicines. · Opioid pain medicine will only give temporary relief and does not treat the problem.  If pain continues, you may need a shot of a steroid drug into your wrist.  · If the above methods fail,

## 2021-07-30 DIAGNOSIS — R74.8 ELEVATED ALKALINE PHOSPHATASE MEASUREMENT: Primary | ICD-10-CM

## 2021-07-30 LAB
GGT SERPL-CCNC: 18 U/L
HCV AB SERPL QL IA: NONREACTIVE

## 2021-08-09 ENCOUNTER — HOSPITAL ENCOUNTER (OUTPATIENT)
Dept: MAMMOGRAPHY | Age: 51
Discharge: HOME | End: 2021-08-09
Attending: FAMILY MEDICINE
Payer: COMMERCIAL

## 2021-08-09 DIAGNOSIS — Z12.31 SCREENING MAMMOGRAM, ENCOUNTER FOR: ICD-10-CM

## 2021-08-09 PROCEDURE — 77063 BREAST TOMOSYNTHESIS BI: CPT | Performed by: FAMILY MEDICINE

## 2021-08-09 PROCEDURE — 77067 SCR MAMMO BI INCL CAD: CPT | Performed by: FAMILY MEDICINE

## 2021-08-12 ENCOUNTER — HOSPITAL ENCOUNTER (OUTPATIENT)
Dept: CT IMAGING | Age: 51
Discharge: HOME | End: 2021-08-12
Attending: FAMILY MEDICINE

## 2021-08-12 DIAGNOSIS — Z12.6 ENCOUNTER FOR SCREENING FOR MALIGNANT NEOPLASM OF BLADDER: ICD-10-CM

## 2021-08-13 ENCOUNTER — HOSPITAL ENCOUNTER (OUTPATIENT)
Dept: ULTRASOUND IMAGING | Age: 51
Discharge: HOME | End: 2021-08-13
Attending: FAMILY MEDICINE

## 2021-08-13 DIAGNOSIS — Z13.6 SCREENING FOR CARDIOVASCULAR CONDITION: ICD-10-CM

## 2021-08-15 RX ORDER — SERTRALINE HCL 100 MG
50 TABLET ORAL DAILY
Qty: 90 TABLET | Refills: 1 | Status: SHIPPED | OUTPATIENT
Start: 2021-08-15

## 2021-09-21 RX ORDER — MONTELUKAST SODIUM 10 MG/1
10 TABLET ORAL NIGHTLY
Qty: 90 TABLET | Refills: 3 | Status: SHIPPED | OUTPATIENT
Start: 2021-09-21

## 2021-09-21 NOTE — TELEPHONE ENCOUNTER
A refill request was received for:  Requested Prescriptions     Pending Prescriptions Disp Refills   • montelukast 10 MG Oral Tab 90 tablet 3     Sig: Take 1 tablet (10 mg total) by mouth nightly.      Last refill date: 09/15/2020  Qty: 90 x3  Last office v

## 2021-11-11 ENCOUNTER — TELEPHONE (OUTPATIENT)
Dept: FAMILY MEDICINE CLINIC | Facility: CLINIC | Age: 51
End: 2021-11-11

## 2021-11-11 NOTE — TELEPHONE ENCOUNTER
Pt called stating that [facility] referred her to Derm to see Dr. Navjot Quintanilla. Stated that she called to make the appointment, but they had no record of the referral.     It is listed under the referral tab, but unsure if order needs to be faxed to office.  Pt did

## 2021-11-11 NOTE — TELEPHONE ENCOUNTER
This RN called Dr. Briggs Call' office and requested fax number: [phone]. This RN faxed referral and pt notified. Left VM for pt that referral was faxed; pt to call to schedule appmnt.     Varsha Carrillo, [address] [facility]  [address]  [address] [facility] [id number]

## 2021-11-18 PROCEDURE — 88305 TISSUE EXAM BY PATHOLOGIST: CPT | Performed by: DERMATOLOGY

## 2021-11-19 ENCOUNTER — LAB REQUISITION (OUTPATIENT)
Dept: LAB | Facility: HOSPITAL | Age: 51
End: 2021-11-19
Payer: COMMERCIAL

## 2021-11-19 DIAGNOSIS — D48.5 NEOPLASM OF UNCERTAIN BEHAVIOR OF SKIN: ICD-10-CM

## 2021-11-22 ENCOUNTER — MED REC SCAN ONLY (OUTPATIENT)
Dept: FAMILY MEDICINE CLINIC | Facility: CLINIC | Age: 51
End: 2021-11-22

## 2021-11-24 ENCOUNTER — NURSE ONLY (OUTPATIENT)
Dept: GASTROENTEROLOGY | Facility: CLINIC | Age: 51
End: 2021-11-24

## 2021-11-24 DIAGNOSIS — Z12.11 SCREENING FOR COLORECTAL CANCER: Primary | ICD-10-CM

## 2021-11-24 DIAGNOSIS — Z12.12 SCREENING FOR COLORECTAL CANCER: Primary | ICD-10-CM

## 2021-12-09 ENCOUNTER — OFFICE VISIT (OUTPATIENT)
Dept: FAMILY MEDICINE CLINIC | Facility: CLINIC | Age: 51
End: 2021-12-09

## 2021-12-09 VITALS
SYSTOLIC BLOOD PRESSURE: 128 MMHG | HEIGHT: 63 IN | HEART RATE: 89 BPM | RESPIRATION RATE: 16 BRPM | BODY MASS INDEX: 30.65 KG/M2 | WEIGHT: 173 LBS | DIASTOLIC BLOOD PRESSURE: 86 MMHG | OXYGEN SATURATION: 98 % | TEMPERATURE: 98 F

## 2021-12-09 DIAGNOSIS — R09.81 SINUS CONGESTION: ICD-10-CM

## 2021-12-09 DIAGNOSIS — H60.502 ACUTE OTITIS EXTERNA OF LEFT EAR, UNSPECIFIED TYPE: Primary | ICD-10-CM

## 2021-12-09 PROCEDURE — 3074F SYST BP LT 130 MM HG: CPT | Performed by: NURSE PRACTITIONER

## 2021-12-09 PROCEDURE — 3008F BODY MASS INDEX DOCD: CPT | Performed by: NURSE PRACTITIONER

## 2021-12-09 PROCEDURE — 3079F DIAST BP 80-89 MM HG: CPT | Performed by: NURSE PRACTITIONER

## 2021-12-09 PROCEDURE — 99213 OFFICE O/P EST LOW 20 MIN: CPT | Performed by: NURSE PRACTITIONER

## 2021-12-09 RX ORDER — CIPROFLOXACIN AND DEXAMETHASONE 3; 1 MG/ML; MG/ML
4 SUSPENSION/ DROPS AURICULAR (OTIC) 2 TIMES DAILY
Qty: 7.5 ML | Refills: 0 | Status: SHIPPED | OUTPATIENT
Start: 2021-12-09 | End: 2021-12-16

## 2021-12-09 RX ORDER — AMOXICILLIN 875 MG/1
875 TABLET, COATED ORAL 2 TIMES DAILY
Qty: 14 TABLET | Refills: 0 | Status: SHIPPED | OUTPATIENT
Start: 2021-12-09 | End: 2021-12-16

## 2021-12-09 NOTE — PROGRESS NOTES
CHIEF COMPLAINT:   Patient presents with:  Ear Problem: Have had headache and head congestion and ear pain congestion the last 4 days. - Entered by patient      HPI:   Renee Iniguez is a 45year old female who presents with symptoms as described belsheree (50 mg total) by mouth daily. 90 tablet 1   • Multiple Vitamins-Minerals (MULTIVITAL) Oral Tab Take 1 tablet by mouth daily. • clotrimazole-betamethasone 1-0.05 % External Cream Apply 1 Application topically 2 (two) times daily.  (Patient not taking: Re erythema noted on upper right location, no bulging, noretraction,no fluid, bony landmarks visible  NOSE: Nostrils patent, clear nasal discharge, nasal mucosa pink   THROAT: Oral mucosa pink, moist. Posterior pharynx is not erythematous. no exudates.  Tonsil for reevaluation for persistent symptoms. Discussed s/s of worsening infection/condition with Patient and importance of prompt medical re-evaluation including when to seek emergency care.  Patient voiced understanding    Comfort care as described in Pa cleared. Prevention  · Keep your ears dry. This helps lower the risk of infection. Dry your ears with a towel or hair dryer after getting wet. Also, use ear plugs when swimming. · Don't stick any objects in the ear to remove wax.   · Talk with your prov over-the-counter medicines. Also tell them about any vitamins, herbal medicines, or anything else you take for your health. If you have diabetes and use urine glucose tests, this medicine may cause incorrect results.  Please check with your doctor before m difficulty breathing, skin rash, itching, swelling, or severe dizziness. If you notice any of these symptoms, seek medical help quickly. Extra  Please speak with your doctor, nurse, or pharmacist if you have any questions about this medicine.   [email or web address]

## 2021-12-09 NOTE — PATIENT INSTRUCTIONS
External Ear Infection (Adult)    External otitis (also called “swimmer’s ear”) is an infection in the ear canal. It's often caused by bacteria or fungus. It can occur a few days after water gets trapped in the ear canal (from swimming or bathing).  It ca to seek medical advice  Call your healthcare provider right away if any of these occur:   · Ear pain becomes worse or doesn’t improve after 3 days of treatment  · Redness or swelling of the outer ear occurs or gets worse  · Headache  · Fever of 100.4ºF (38 instructed. Please tell your doctor if you have moderate to severe diarrhea while on this medicine. Do not treat the diarrhea with over-the-counter diarrhea medicine.   Tell the doctor or pharmacist if you are pregnant, planning to be pregnant, or breastfe their advice. There is a more complete description of this medicine available in [place]. Scan this code on your smartphone or tablet or use the web address below. You can also ask your pharmacist for a printout.  If you have any questions, please ask your p

## 2021-12-14 NOTE — PROGRESS NOTES
Called patient to help assist with scheduling procedure.      [facility] to help assist with scheduling procedure.      [facility] patient to help assist with scheduling procedure.      [facility]
Called patient to help assist with scheduling procedure. Patient is currently unavailable states she will call back tomorrow.
James Ritter     Patient Kimberly Jeffery was previously seen by you on 7/21/2020 and is now ready to schedule her colonoscopy. May I proceed with the orders given at this visit? Please advise-     Thank you! Previous orders-    1.  Schedule colonoscopy with 
Okay to use order from visit. Thanks!
Pt returned call,
Scheduled for:  Colonoscopy [address]    Provider Name:  Dr. Hanh Gamboa  Date:  2/7/2022  Location:  [facility]  Sedation:  MAC  Time:  2:00 pm Patient is aware she will receive a call the day before with her arrival time.    Prep:  Split dose trilyte  Meds/Allergies Jacek
[phone]

## 2022-01-26 ENCOUNTER — TELEPHONE (OUTPATIENT)
Dept: GASTROENTEROLOGY | Facility: CLINIC | Age: 52
End: 2022-01-26

## 2022-01-26 DIAGNOSIS — Z12.11 COLON CANCER SCREENING: Primary | ICD-10-CM

## 2022-01-26 NOTE — TELEPHONE ENCOUNTER
Rescheduled for:  Colonoscopy [address]  Provider Name:  Dr Ernestina Lee  Date:  4/4/2022  Location:  [facility]  Sedation:  MAC  Time:  2:00 pm (pt is aware that [facility] will call the day before to confirm arrival time)   Prep:  Trilyte  Meds/Allergies Reconciled?:  Physician

## 2022-01-26 NOTE — TELEPHONE ENCOUNTER
Pt tested positive on 1/24/22, needs to be rescheduled. Canceled on epic as well as case tabs.     Canceled for:  Colonoscopy [address]3041    Provider Name:  Dr. Arnulfo Lange  Date:  2/7/2022  Location:  [facility]  Sedation:  MAC  Time:  2:00 pm Patient is aware she will rece

## 2022-02-08 ENCOUNTER — TELEPHONE (OUTPATIENT)
Dept: FAMILY MEDICINE CLINIC | Facility: CLINIC | Age: 52
End: 2022-02-08

## 2022-02-08 ENCOUNTER — HOSPITAL ENCOUNTER (EMERGENCY)
Facility: HOSPITAL | Age: 52
Discharge: HOME | End: 2022-02-08
Attending: EMERGENCY MEDICINE
Payer: COMMERCIAL

## 2022-02-08 ENCOUNTER — APPOINTMENT (OUTPATIENT)
Dept: GENERAL RADIOLOGY | Facility: HOSPITAL | Age: 52
End: 2022-02-08
Attending: EMERGENCY MEDICINE
Payer: COMMERCIAL

## 2022-02-08 VITALS
SYSTOLIC BLOOD PRESSURE: 134 MMHG | HEART RATE: 79 BPM | OXYGEN SATURATION: 97 % | BODY MASS INDEX: 30 KG/M2 | WEIGHT: 170 LBS | RESPIRATION RATE: 17 BRPM | DIASTOLIC BLOOD PRESSURE: 78 MMHG | TEMPERATURE: 97 F

## 2022-02-08 DIAGNOSIS — R00.2 PALPITATIONS: Primary | ICD-10-CM

## 2022-02-08 LAB
ANION GAP SERPL CALC-SCNC: 6 MMOL/L (ref 0–18)
BASOPHILS # BLD AUTO: 0.02 X10(3) UL (ref 0–0.2)
BASOPHILS NFR BLD AUTO: 0.3 %
BUN BLD-MCNC: 19 MG/DL (ref 7–18)
BUN/CREAT SERPL: 23.8 (ref 10–20)
CALCIUM BLD-MCNC: 9.6 MG/DL (ref 8.5–10.1)
CHLORIDE SERPL-SCNC: 107 MMOL/L (ref 98–112)
CO2 SERPL-SCNC: 27 MMOL/L (ref 21–32)
CREAT BLD-MCNC: 0.8 MG/DL
DEPRECATED RDW RBC AUTO: 41.2 FL (ref 35.1–46.3)
EOSINOPHIL # BLD AUTO: 0.19 X10(3) UL (ref 0–0.7)
ERYTHROCYTE [DISTWIDTH] IN BLOOD BY AUTOMATED COUNT: 12.5 % (ref 11–15)
GLUCOSE BLD-MCNC: 87 MG/DL (ref 70–99)
HCT VFR BLD AUTO: 41.9 %
HGB BLD-MCNC: 14.2 G/DL
IMM GRANULOCYTES # BLD AUTO: 0.02 X10(3) UL (ref 0–1)
IMM GRANULOCYTES NFR BLD: 0.3 %
LYMPHOCYTES # BLD AUTO: 2.26 X10(3) UL (ref 1–4)
LYMPHOCYTES NFR BLD AUTO: 30 %
MCH RBC QN AUTO: 30.8 PG (ref 26–34)
MCHC RBC AUTO-ENTMCNC: 33.9 G/DL (ref 31–37)
MCV RBC AUTO: 90.9 FL
MONOCYTES # BLD AUTO: 0.44 X10(3) UL (ref 0.1–1)
MONOCYTES NFR BLD AUTO: 5.8 %
NEUTROPHILS # BLD AUTO: 4.6 X10 (3) UL (ref 1.5–7.7)
NEUTROPHILS # BLD AUTO: 4.6 X10(3) UL (ref 1.5–7.7)
NEUTROPHILS NFR BLD AUTO: 61.1 %
OSMOLALITY SERPL CALC.SUM OF ELEC: 292 MOSM/KG (ref 275–295)
PLATELET # BLD AUTO: 166 10(3)UL (ref 150–450)
POTASSIUM SERPL-SCNC: 3.8 MMOL/L (ref 3.5–5.1)
RBC # BLD AUTO: 4.61 X10(6)UL
SODIUM SERPL-SCNC: 140 MMOL/L (ref 136–145)
TROPONIN I HIGH SENSITIVITY: 3 NG/L
WBC # BLD AUTO: 7.5 X10(3) UL (ref 4–11)

## 2022-02-08 PROCEDURE — 93010 ELECTROCARDIOGRAM REPORT: CPT | Performed by: INTERNAL MEDICINE

## 2022-02-08 PROCEDURE — 84484 ASSAY OF TROPONIN QUANT: CPT | Performed by: EMERGENCY MEDICINE

## 2022-02-08 PROCEDURE — 71045 X-RAY EXAM CHEST 1 VIEW: CPT | Performed by: EMERGENCY MEDICINE

## 2022-02-08 PROCEDURE — 36415 COLL VENOUS BLD VENIPUNCTURE: CPT

## 2022-02-08 PROCEDURE — 85025 COMPLETE CBC W/AUTO DIFF WBC: CPT | Performed by: EMERGENCY MEDICINE

## 2022-02-08 PROCEDURE — 93005 ELECTROCARDIOGRAM TRACING: CPT

## 2022-02-08 PROCEDURE — 80048 BASIC METABOLIC PNL TOTAL CA: CPT | Performed by: EMERGENCY MEDICINE

## 2022-02-08 PROCEDURE — 99284 EMERGENCY DEPT VISIT MOD MDM: CPT

## 2022-02-08 NOTE — ED QUICK NOTES
Assumed care of patient from triage. patient to ED from home for palpitations, chest pain. Patient states that she has been having palpitations x1 week, and that she has been having intermittent left sided chest pain for a few days. Patient states it was worse today and that she felt her arm was heavy. Patient is alert, oriented x4, breathing with ease, in no apparent distress. Call light within reach.

## 2022-02-08 NOTE — ED INITIAL ASSESSMENT (HPI)
patient states x a few days she has been having left side chest pain.  Also c/o left arm pain and feels her heart is \"beating really fast.\"

## 2022-02-08 NOTE — TELEPHONE ENCOUNTER
Pt is scheduled for [facility] tomorrow for heart monitor falling ER visit today 2/8. States she was told someone from [facility]'s office call pt, bus not heard anything. Pt needs ER Follow Up appointment, but openings available unless SDA approved. Pt also inquiring if she will need order/rferral for monitor?

## 2022-02-08 NOTE — ED QUICK NOTES
Reviewed discharge information with patient. Patient verbalized understanding, no further questions or complaints at this time. Patient is alert and orientated x4, in no apparent distress, ambulating with steady gait. IV discontinued. Instructed patient to return to ED for any new or worsening symptoms.

## 2022-02-09 ENCOUNTER — OFFICE VISIT (OUTPATIENT)
Dept: FAMILY MEDICINE CLINIC | Facility: CLINIC | Age: 52
End: 2022-02-09
Payer: COMMERCIAL

## 2022-02-09 VITALS
OXYGEN SATURATION: 97 % | SYSTOLIC BLOOD PRESSURE: 122 MMHG | HEIGHT: 63 IN | WEIGHT: 172 LBS | BODY MASS INDEX: 30.48 KG/M2 | DIASTOLIC BLOOD PRESSURE: 72 MMHG | HEART RATE: 102 BPM

## 2022-02-09 DIAGNOSIS — R00.2 PALPITATIONS: Primary | ICD-10-CM

## 2022-02-09 DIAGNOSIS — R07.89 LEFT-SIDED CHEST WALL PAIN: ICD-10-CM

## 2022-02-09 DIAGNOSIS — E78.2 MIXED HYPERLIPIDEMIA: ICD-10-CM

## 2022-02-09 PROCEDURE — 99214 OFFICE O/P EST MOD 30 MIN: CPT | Performed by: FAMILY MEDICINE

## 2022-02-09 PROCEDURE — 3078F DIAST BP <80 MM HG: CPT | Performed by: FAMILY MEDICINE

## 2022-02-09 PROCEDURE — 3008F BODY MASS INDEX DOCD: CPT | Performed by: FAMILY MEDICINE

## 2022-02-09 PROCEDURE — 3074F SYST BP LT 130 MM HG: CPT | Performed by: FAMILY MEDICINE

## 2022-02-09 RX ORDER — PREDNISONE 20 MG/1
40 TABLET ORAL DAILY
Qty: 10 TABLET | Refills: 0 | Status: SHIPPED | OUTPATIENT
Start: 2022-02-09 | End: 2022-02-14

## 2022-02-15 RX ORDER — SERTRALINE HCL 100 MG
50 TABLET ORAL DAILY
Qty: 90 TABLET | Refills: 0 | Status: SHIPPED | OUTPATIENT
Start: 2022-02-15 | End: 2023-01-06

## 2022-02-16 ENCOUNTER — HOSPITAL ENCOUNTER (OUTPATIENT)
Dept: CV DIAGNOSTICS | Facility: HOSPITAL | Age: 52
Discharge: HOME | End: 2022-02-16
Attending: FAMILY MEDICINE
Payer: COMMERCIAL

## 2022-02-16 DIAGNOSIS — R00.2 PALPITATIONS: ICD-10-CM

## 2022-02-16 PROCEDURE — 93306 TTE W/DOPPLER COMPLETE: CPT | Performed by: FAMILY MEDICINE

## 2022-02-18 ENCOUNTER — TELEPHONE (OUTPATIENT)
Dept: FAMILY MEDICINE CLINIC | Facility: CLINIC | Age: 52
End: 2022-02-18

## 2022-02-18 NOTE — TELEPHONE ENCOUNTER
Called pt and sent message to [facility] at Referral for approval if possible. Pt is currently wearing 14 day heart monitor.

## 2022-02-18 NOTE — TELEPHONE ENCOUNTER
REFERRED TO PROVIDER ON [facility] IN NETWORK LIST /NO [address] [address]68 / Pranay Garciarcmaurisio 15 pt and informed referral is authorized.

## 2022-02-23 ENCOUNTER — LAB ENCOUNTER (OUTPATIENT)
Dept: LAB | Age: 52
End: 2022-02-23
Attending: INTERNAL MEDICINE
Payer: COMMERCIAL

## 2022-02-23 DIAGNOSIS — K76.0 HEPATIC STEATOSIS: ICD-10-CM

## 2022-02-23 DIAGNOSIS — I47.2 VENTRICULAR TACHYCARDIA (HCC): ICD-10-CM

## 2022-02-23 DIAGNOSIS — E78.5 HYPERLIPIDEMIA: Primary | ICD-10-CM

## 2022-02-23 DIAGNOSIS — R00.2 PALPITATIONS: ICD-10-CM

## 2022-02-23 LAB
CRP SERPL HS-MCNC: 2.28 MG/L (ref ?–3)
TSI SER-ACNC: 1.3 MIU/ML (ref 0.36–3.74)

## 2022-02-23 PROCEDURE — 86141 C-REACTIVE PROTEIN HS: CPT

## 2022-02-23 PROCEDURE — 36415 COLL VENOUS BLD VENIPUNCTURE: CPT

## 2022-02-23 PROCEDURE — 84443 ASSAY THYROID STIM HORMONE: CPT

## 2022-03-25 ENCOUNTER — HOSPITAL ENCOUNTER (OUTPATIENT)
Age: 52
Discharge: HOME | End: 2022-03-25
Payer: COMMERCIAL

## 2022-03-25 ENCOUNTER — APPOINTMENT (OUTPATIENT)
Dept: GENERAL RADIOLOGY | Age: 52
End: 2022-03-25
Attending: NURSE PRACTITIONER
Payer: COMMERCIAL

## 2022-03-25 VITALS
OXYGEN SATURATION: 100 % | HEART RATE: 85 BPM | TEMPERATURE: 97 F | SYSTOLIC BLOOD PRESSURE: 109 MMHG | DIASTOLIC BLOOD PRESSURE: 73 MMHG | RESPIRATION RATE: 18 BRPM

## 2022-03-25 DIAGNOSIS — Z86.16 HISTORY OF 2019 NOVEL CORONAVIRUS DISEASE (COVID-19): ICD-10-CM

## 2022-03-25 DIAGNOSIS — U09.9 POST-COVID-19 SYNDROME: Primary | ICD-10-CM

## 2022-03-25 LAB
#MXD IC: 0.5 X10ˆ3/UL (ref 0.1–1)
DDIMER WHOLE BLOOD: <200 NG/ML DDU (ref ?–400)
HCT VFR BLD AUTO: 40.9 %
HGB BLD-MCNC: 14.3 G/DL
LYMPHOCYTES # BLD AUTO: 2.6 X10ˆ3/UL (ref 1–4)
LYMPHOCYTES NFR BLD AUTO: 31.1 %
MCH RBC QN AUTO: 31.1 PG (ref 26–34)
MCHC RBC AUTO-ENTMCNC: 35 G/DL (ref 31–37)
MCV RBC AUTO: 88.9 FL (ref 80–100)
MIXED CELL %: 5.7 %
NEUTROPHILS # BLD AUTO: 5.2 X10ˆ3/UL (ref 1.5–7.7)
NEUTROPHILS NFR BLD AUTO: 63.2 %
PLATELET # BLD AUTO: 156 X10ˆ3/UL (ref 150–450)
RBC # BLD AUTO: 4.6 X10ˆ6/UL
WBC # BLD AUTO: 8.3 X10ˆ3/UL (ref 4–11)

## 2022-03-25 PROCEDURE — 85025 COMPLETE CBC W/AUTO DIFF WBC: CPT | Performed by: NURSE PRACTITIONER

## 2022-03-25 PROCEDURE — 99213 OFFICE O/P EST LOW 20 MIN: CPT | Performed by: NURSE PRACTITIONER

## 2022-03-25 PROCEDURE — 71046 X-RAY EXAM CHEST 2 VIEWS: CPT | Performed by: NURSE PRACTITIONER

## 2022-03-25 RX ORDER — ALBUTEROL SULFATE 90 UG/1
2 AEROSOL, METERED RESPIRATORY (INHALATION) EVERY 4 HOURS PRN
Qty: 1 EACH | Refills: 0 | Status: SHIPPED | OUTPATIENT
Start: 2022-03-25 | End: 2022-04-24

## 2022-03-25 RX ORDER — METOPROLOL SUCCINATE 25 MG/1
25 TABLET, EXTENDED RELEASE ORAL DAILY
COMMUNITY
Start: 2022-02-23

## 2022-03-25 NOTE — ED INITIAL ASSESSMENT (HPI)
Pt states having a hx of asthma and states has been having some SOB for the last month. Pt states having left under arm pain for the last few days. . Pt states has been having dry cough that has been going on for only the last few days as well. Pt denies fever. Pt states has been slightly constipated. Pt denies vomiting. Pt denies chest pain.

## 2022-03-30 ENCOUNTER — TELEPHONE (OUTPATIENT)
Dept: GASTROENTEROLOGY | Facility: CLINIC | Age: 52
End: 2022-03-30

## 2022-03-30 NOTE — TELEPHONE ENCOUNTER
Notified by [facility] re: ongoing dyspnea/chest pain. Will need to cancel CLN. Left VM regarding cancellation--asked pt to f/u with her pcp or cardiology. GI Staff:   Please remove patient from my schedule.

## 2022-03-30 NOTE — TELEPHONE ENCOUNTER
Patient calling to cancel colonoscopy scheduled on 4/4 due to needing cardiac clearance needed and other issues post covid. Patient will call back when ready to schedule, thanks.

## 2022-04-12 ENCOUNTER — TELEMEDICINE (OUTPATIENT)
Dept: FAMILY MEDICINE CLINIC | Facility: CLINIC | Age: 52
End: 2022-04-12
Payer: COMMERCIAL

## 2022-04-12 DIAGNOSIS — U09.9 POST-COVID CHRONIC DYSPNEA: Primary | ICD-10-CM

## 2022-04-12 DIAGNOSIS — R00.2 PALPITATIONS: ICD-10-CM

## 2022-04-12 DIAGNOSIS — R06.09 POST-COVID CHRONIC DYSPNEA: Primary | ICD-10-CM

## 2022-04-12 PROCEDURE — 99214 OFFICE O/P EST MOD 30 MIN: CPT | Performed by: FAMILY MEDICINE

## 2022-04-12 RX ORDER — DEXAMETHASONE 4 MG/1
1 TABLET ORAL 2 TIMES DAILY
Qty: 12 G | Refills: 0 | Status: SHIPPED | OUTPATIENT
Start: 2022-04-12

## 2022-04-12 RX ORDER — OMEGA-3 FATTY ACIDS/FISH OIL 300-1000MG
CAPSULE ORAL
COMMUNITY

## 2022-06-20 ENCOUNTER — OFFICE VISIT (OUTPATIENT)
Dept: FAMILY MEDICINE CLINIC | Facility: CLINIC | Age: 52
End: 2022-06-20
Payer: COMMERCIAL

## 2022-06-20 VITALS
BODY MASS INDEX: 30.48 KG/M2 | DIASTOLIC BLOOD PRESSURE: 62 MMHG | WEIGHT: 172 LBS | HEIGHT: 63 IN | OXYGEN SATURATION: 97 % | SYSTOLIC BLOOD PRESSURE: 102 MMHG | HEART RATE: 80 BPM

## 2022-06-20 DIAGNOSIS — R06.02 SHORTNESS OF BREATH: Primary | ICD-10-CM

## 2022-06-20 DIAGNOSIS — Z12.31 SCREENING MAMMOGRAM, ENCOUNTER FOR: ICD-10-CM

## 2022-06-20 DIAGNOSIS — R00.2 PALPITATIONS: ICD-10-CM

## 2022-06-20 DIAGNOSIS — Z12.11 COLON CANCER SCREENING: ICD-10-CM

## 2022-06-21 ENCOUNTER — MED REC SCAN ONLY (OUTPATIENT)
Dept: FAMILY MEDICINE CLINIC | Facility: CLINIC | Age: 52
End: 2022-06-21

## 2022-07-06 ENCOUNTER — HOSPITAL ENCOUNTER (OUTPATIENT)
Dept: CT IMAGING | Facility: HOSPITAL | Age: 52
Discharge: HOME | End: 2022-07-06
Attending: FAMILY MEDICINE
Payer: COMMERCIAL

## 2022-07-06 DIAGNOSIS — R06.02 SHORTNESS OF BREATH: ICD-10-CM

## 2022-07-06 PROCEDURE — 71250 CT THORAX DX C-: CPT | Performed by: FAMILY MEDICINE

## 2022-09-02 ENCOUNTER — OFFICE VISIT (OUTPATIENT)
Dept: PULMONOLOGY | Facility: CLINIC | Age: 52
End: 2022-09-02
Payer: COMMERCIAL

## 2022-09-02 VITALS
WEIGHT: 173 LBS | RESPIRATION RATE: 18 BRPM | DIASTOLIC BLOOD PRESSURE: 80 MMHG | HEART RATE: 88 BPM | SYSTOLIC BLOOD PRESSURE: 113 MMHG | OXYGEN SATURATION: 98 % | BODY MASS INDEX: 31 KG/M2

## 2022-09-02 DIAGNOSIS — R06.00 DYSPNEA, UNSPECIFIED TYPE: Primary | ICD-10-CM

## 2022-09-02 DIAGNOSIS — R59.0 MEDIASTINAL LYMPHADENOPATHY: ICD-10-CM

## 2022-09-02 PROCEDURE — 3079F DIAST BP 80-89 MM HG: CPT | Performed by: INTERNAL MEDICINE

## 2022-09-02 PROCEDURE — 99244 OFF/OP CNSLTJ NEW/EST MOD 40: CPT | Performed by: INTERNAL MEDICINE

## 2022-09-02 PROCEDURE — 3074F SYST BP LT 130 MM HG: CPT | Performed by: INTERNAL MEDICINE

## 2022-09-19 RX ORDER — MONTELUKAST SODIUM 10 MG/1
10 TABLET ORAL NIGHTLY
Qty: 90 TABLET | Refills: 1 | Status: SHIPPED | OUTPATIENT
Start: 2022-09-19

## 2022-09-19 NOTE — TELEPHONE ENCOUNTER
Refill passed per [place] [employer] protocol.     .  Requested Prescriptions   Pending Prescriptions Disp Refills    MONTELUKAST 10 MG Oral Tab [Pharmacy Med Name: MONTELUKAST 10MG TABLETS] 90 tablet 3     Sig: TAKE 1 TABLET(10 MG) BY MOUTH EVERY NIGHT        Asthma & COPD Medication Protocol Passed - 9/17/2022  3:51 AM        Passed - In person appointment or virtual visit in the past 6 mos or appointment in next 3 mos       Recent Outpatient Visits              2 weeks ago Dyspnea, unspecified type    [place], [address] [facility], [place]    Office Visit    3 months ago Shortness of breath    [address] [facility], [address] [place], [place]    Office Visit    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    [address] [facility], DO    Telemedicine    7 months ago Chikis Mulligan, Venita Chavira, [facility] Visit    9 months ago Acute otitis externa of left ear, unspecified type    Rosaura Ledezma APN    Office Visit                       Recent Outpatient Visits              2 weeks ago Dyspnea, unspecified type    [place], [address] [facility], [place], [address] Visit    3 months ago Shortness of breath    [address] [facility], Ven[place]    Office Visit    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

## 2022-10-11 ENCOUNTER — TELEPHONE (OUTPATIENT)
Dept: PULMONOLOGY | Facility: CLINIC | Age: 52
End: 2022-10-11

## 2022-10-11 NOTE — TELEPHONE ENCOUNTER
Patient was seen at the immediate care and was told she needs to see an eye doctor. Patient needs a referral for an eye doctor, immediate care suggested Joey Curry. Rx request

## 2022-10-11 NOTE — TELEPHONE ENCOUNTER
Patient is requesting call back from nurse regarding assistance to schedule pulmonary function test.

## 2022-10-13 NOTE — TELEPHONE ENCOUNTER
Spoke with patient states she called scheduling at [phone] and they did not know what test she was referring to. Provided patient with Central Scheduling's number ([phone]). Patient verbalized understanding.

## 2022-11-28 NOTE — TELEPHONE ENCOUNTER
Pt called stating she lost her inhaler and is requesting a refill    Please advise     [address], [address] [facility] AT [address], [phone], [address] [id number]   Phone: [phone] Fax: [phone]   Hours: Not open 24 hours

## 2022-11-29 RX ORDER — ALBUTEROL SULFATE 90 UG/1
2 AEROSOL, METERED RESPIRATORY (INHALATION) EVERY 4 HOURS PRN
Qty: 1 EACH | Refills: 2 | Status: SHIPPED | OUTPATIENT
Start: 2022-11-29 | End: 2022-12-29

## 2022-11-29 NOTE — TELEPHONE ENCOUNTER
Refill passed per [address] [place] [address] protocol. Previously prescribed by another provider. Requested Prescriptions   Pending Prescriptions Disp Refills    albuterol 108 (90 Base) MCG/ACT Inhalation Aero Soln 1 each 0     Sig: Inhale 2 puffs into the lungs every 4 (four) hours as needed for Wheezing.        Asthma & COPD Medication Protocol Passed - 11/29/2022  9:16 AM        Passed - In person appointment or virtual visit in the past 6 mos or appointment in next 3 mos     Recent Outpatient Visits              2 months ago Dyspnea, unspecified type    [place], [address] [place], [place]    Office Visit    5 months ago Shortness of breath    [address] [facility], [address] [place], [place]    Office Visit    7 months ago Post-COVID chronic dyspnea    [address] [facility] [address] [place], [place]    Telemedicine    9 months ago Chikis Mulligan, [place], [address] [place] [address] Visit    11 months ago Acute otitis externa of left ear, unspecified type    [facility] Cornelio Taylor, APN    Office Visit                           Recent Outpatient Visits              2 months ago Dyspnea, unspecified type    [place], [facility] [place], [address] Visit    5 months ago Shortness of breath    [address] [facility], [address] [place], [place]    Office Visit    7 months ago Post-COVID chronic dyspnea    [address] [facility] [address] [place], [place]    Telemedicine    9 months ago Chikis Mulligan [place], [place]    Office Visit    11 months ago Acute otitis externa of left ear, unspecified type    [facility] KIRIT Rodas    Office Visit

## 2022-12-15 ENCOUNTER — TELEPHONE (OUTPATIENT)
Dept: PULMONOLOGY | Facility: CLINIC | Age: 52
End: 2022-12-15

## 2022-12-15 NOTE — TELEPHONE ENCOUNTER
Patient is due for CT Chest in January. Letter sent to patient. Manage care please review if PA is needed.

## 2023-01-06 ENCOUNTER — TELEPHONE (OUTPATIENT)
Dept: FAMILY MEDICINE CLINIC | Facility: CLINIC | Age: 53
End: 2023-01-06

## 2023-01-06 NOTE — TELEPHONE ENCOUNTER
Please review. Protocol failed / No Protocol. Patient already received 90 days    Requested Prescriptions   Pending Prescriptions Disp Refills    ZOLOFT 100 MG Oral Tab 90 tablet 0     Sig: Take 0.5 tablets (50 mg total) by mouth daily.        Psychiatric Non-Scheduled (Anti-Anxiety) Failed - 1/6/2023 12:01 PM        Failed - In person appointment or virtual visit in the past 6 mos or appointment in next 3 mos     Recent Outpatient Visits              4 months ago Dyspnea, unspecified type    [place], [facility], [place], [place]    Office Visit    6 months ago Shortness of breath    [address] [place]    Office Visit    8 months ago Post-COVID chronic dyspnea    [address], [address] [place], [place]    Telemedicine    11 months ago [place], [address] [facility], [facility] [facility] Visit    1 year ago Acute otitis externa of left ear, unspecified type    [facility] KIRIT Car    Office Visit

## 2023-01-07 RX ORDER — SERTRALINE HCL 100 MG
50 TABLET ORAL DAILY
Qty: 90 TABLET | Refills: 0 | Status: SHIPPED | OUTPATIENT
Start: 2023-01-07

## 2023-01-09 NOTE — TELEPHONE ENCOUNTER
Unless she has a concern she is okay to come in for her annual physical when due, although it looks like she is due now.

## 2023-01-10 ENCOUNTER — TELEPHONE (OUTPATIENT)
Dept: FAMILY MEDICINE CLINIC | Facility: CLINIC | Age: 53
End: 2023-01-10

## 2023-01-11 RX ORDER — SERTRALINE HCL 100 MG
50 TABLET ORAL DAILY
Qty: 90 TABLET | Refills: 0 | OUTPATIENT
Start: 2023-01-11

## 2023-01-11 NOTE — TELEPHONE ENCOUNTER
Called pharmacy, they have a profile for patient under \"Brown\" they will reach out to her and see which is her current one.

## 2023-01-11 NOTE — TELEPHONE ENCOUNTER
Patient called stated the pharmacy stated they never received a refill request. The patient is following up with us.

## 2023-03-06 ENCOUNTER — OFFICE VISIT (OUTPATIENT)
Dept: FAMILY MEDICINE CLINIC | Facility: CLINIC | Age: 53
End: 2023-03-06
Payer: COMMERCIAL

## 2023-03-06 VITALS
OXYGEN SATURATION: 96 % | RESPIRATION RATE: 16 BRPM | WEIGHT: 175 LBS | HEIGHT: 62 IN | DIASTOLIC BLOOD PRESSURE: 77 MMHG | BODY MASS INDEX: 32.2 KG/M2 | TEMPERATURE: 98 F | SYSTOLIC BLOOD PRESSURE: 131 MMHG | HEART RATE: 105 BPM

## 2023-03-06 DIAGNOSIS — R05.1 ACUTE COUGH: ICD-10-CM

## 2023-03-06 DIAGNOSIS — R06.2 WHEEZING: ICD-10-CM

## 2023-03-06 DIAGNOSIS — J01.10 ACUTE NON-RECURRENT FRONTAL SINUSITIS: ICD-10-CM

## 2023-03-06 RX ORDER — METHYLPREDNISOLONE 4 MG/1
TABLET ORAL
Qty: 21 EACH | Refills: 0 | Status: SHIPPED | OUTPATIENT
Start: 2023-03-06

## 2023-03-06 RX ORDER — AMOXICILLIN AND CLAVULANATE POTASSIUM 875; 125 MG/1; MG/1
1 TABLET, FILM COATED ORAL 2 TIMES DAILY
Qty: 14 TABLET | Refills: 0 | Status: SHIPPED | OUTPATIENT
Start: 2023-03-06 | End: 2023-03-13

## 2023-04-06 ENCOUNTER — NURSE TRIAGE (OUTPATIENT)
Facility: CLINIC | Age: 53
End: 2023-04-06

## 2023-04-07 ENCOUNTER — OFFICE VISIT (OUTPATIENT)
Dept: INTERNAL MEDICINE CLINIC | Facility: CLINIC | Age: 53
End: 2023-04-07

## 2023-04-07 VITALS
OXYGEN SATURATION: 97 % | SYSTOLIC BLOOD PRESSURE: 108 MMHG | HEIGHT: 62 IN | HEART RATE: 86 BPM | WEIGHT: 175 LBS | DIASTOLIC BLOOD PRESSURE: 60 MMHG | BODY MASS INDEX: 32.2 KG/M2 | RESPIRATION RATE: 14 BRPM

## 2023-04-07 DIAGNOSIS — R10.11 RUQ PAIN: Primary | ICD-10-CM

## 2023-04-07 PROCEDURE — 99204 OFFICE O/P NEW MOD 45 MIN: CPT | Performed by: INTERNAL MEDICINE

## 2023-04-07 PROCEDURE — 3008F BODY MASS INDEX DOCD: CPT | Performed by: INTERNAL MEDICINE

## 2023-04-07 PROCEDURE — 3078F DIAST BP <80 MM HG: CPT | Performed by: INTERNAL MEDICINE

## 2023-04-07 PROCEDURE — 3074F SYST BP LT 130 MM HG: CPT | Performed by: INTERNAL MEDICINE

## 2023-05-17 ENCOUNTER — OFFICE VISIT (OUTPATIENT)
Dept: FAMILY MEDICINE CLINIC | Facility: CLINIC | Age: 53
End: 2023-05-17
Payer: COMMERCIAL

## 2023-05-17 VITALS
RESPIRATION RATE: 18 BRPM | SYSTOLIC BLOOD PRESSURE: 137 MMHG | WEIGHT: 183 LBS | TEMPERATURE: 98 F | BODY MASS INDEX: 33.68 KG/M2 | OXYGEN SATURATION: 97 % | HEIGHT: 62 IN | HEART RATE: 79 BPM | DIASTOLIC BLOOD PRESSURE: 89 MMHG

## 2023-05-17 DIAGNOSIS — L25.5 PLANT DERMATITIS: Primary | ICD-10-CM

## 2023-05-17 PROCEDURE — 3079F DIAST BP 80-89 MM HG: CPT | Performed by: NURSE PRACTITIONER

## 2023-05-17 PROCEDURE — 99213 OFFICE O/P EST LOW 20 MIN: CPT | Performed by: NURSE PRACTITIONER

## 2023-05-17 PROCEDURE — 3075F SYST BP GE 130 - 139MM HG: CPT | Performed by: NURSE PRACTITIONER

## 2023-05-17 PROCEDURE — 3008F BODY MASS INDEX DOCD: CPT | Performed by: NURSE PRACTITIONER

## 2023-05-17 RX ORDER — PREDNISONE 10 MG/1
TABLET ORAL
Qty: 21 TABLET | Refills: 0 | Status: SHIPPED | OUTPATIENT
Start: 2023-05-17 | End: 2023-05-29

## 2023-05-25 ENCOUNTER — TELEPHONE (OUTPATIENT)
Facility: CLINIC | Age: 53
End: 2023-05-25

## 2023-05-25 ENCOUNTER — OFFICE VISIT (OUTPATIENT)
Dept: FAMILY MEDICINE CLINIC | Facility: CLINIC | Age: 53
End: 2023-05-25
Payer: COMMERCIAL

## 2023-05-25 VITALS
OXYGEN SATURATION: 97 % | DIASTOLIC BLOOD PRESSURE: 77 MMHG | TEMPERATURE: 98 F | HEART RATE: 80 BPM | WEIGHT: 176 LBS | BODY MASS INDEX: 32.39 KG/M2 | RESPIRATION RATE: 18 BRPM | HEIGHT: 62 IN | SYSTOLIC BLOOD PRESSURE: 129 MMHG

## 2023-05-25 DIAGNOSIS — R21 RASH AND NONSPECIFIC SKIN ERUPTION: Primary | ICD-10-CM

## 2023-05-25 DIAGNOSIS — L30.9 DERMATITIS: Primary | ICD-10-CM

## 2023-05-25 PROCEDURE — 3008F BODY MASS INDEX DOCD: CPT | Performed by: NURSE PRACTITIONER

## 2023-05-25 PROCEDURE — 3078F DIAST BP <80 MM HG: CPT | Performed by: NURSE PRACTITIONER

## 2023-05-25 PROCEDURE — 99214 OFFICE O/P EST MOD 30 MIN: CPT | Performed by: NURSE PRACTITIONER

## 2023-05-25 PROCEDURE — 3074F SYST BP LT 130 MM HG: CPT | Performed by: NURSE PRACTITIONER

## 2023-05-25 RX ORDER — HYDROXYZINE HYDROCHLORIDE 25 MG/1
TABLET, FILM COATED ORAL
Qty: 10 TABLET | Refills: 0 | Status: SHIPPED | OUTPATIENT
Start: 2023-05-25

## 2023-05-25 RX ORDER — PERMETHRIN 50 MG/G
CREAM TOPICAL
Qty: 60 G | Refills: 1 | Status: SHIPPED | OUTPATIENT
Start: 2023-05-25

## 2023-05-25 NOTE — TELEPHONE ENCOUNTER
Patient states needs to see a Dermatologist, hopefully soon. States since last Tuesday she's been to [facility] for this. Was give steroids first time which hurt her stomach and second provider today informed her she'll treat her for scabies, even though she doesn't think it's scabies. Itchy rash on legs and arms with occasional chills. Denies fever or mouth sores.     Asking for Derm referral.

## 2023-05-26 NOTE — TELEPHONE ENCOUNTER
Spoke to patient. She was asking for update on referral.     Edilberto Grey [phone] [facility] [phone]     Gave patient referral information. She will call if she needs anything else.

## 2023-05-31 ENCOUNTER — OFFICE VISIT (OUTPATIENT)
Dept: DERMATOLOGY CLINIC | Facility: CLINIC | Age: 53
End: 2023-05-31

## 2023-05-31 DIAGNOSIS — L23.9 ALLERGIC CONTACT DERMATITIS, UNSPECIFIED TRIGGER: Primary | ICD-10-CM

## 2023-05-31 PROCEDURE — 99204 OFFICE O/P NEW MOD 45 MIN: CPT | Performed by: STUDENT IN AN ORGANIZED HEALTH CARE EDUCATION/TRAINING PROGRAM

## 2023-05-31 RX ORDER — PREDNISONE 20 MG/1
40 TABLET ORAL DAILY
COMMUNITY
Start: 2023-05-28

## 2023-05-31 RX ORDER — CLOBETASOL PROPIONATE 0.5 MG/G
OINTMENT TOPICAL
Qty: 60 G | Refills: 3 | Status: SHIPPED | OUTPATIENT
Start: 2023-05-31

## 2023-05-31 RX ORDER — CLOTRIMAZOLE 1 %
1 CREAM (GRAM) TOPICAL 2 TIMES DAILY
COMMUNITY
Start: 2023-05-28

## 2023-06-06 ENCOUNTER — OFFICE VISIT (OUTPATIENT)
Facility: CLINIC | Age: 53
End: 2023-06-06
Payer: COMMERCIAL

## 2023-06-06 VITALS
SYSTOLIC BLOOD PRESSURE: 130 MMHG | DIASTOLIC BLOOD PRESSURE: 82 MMHG | OXYGEN SATURATION: 99 % | HEART RATE: 86 BPM | HEIGHT: 62 IN | WEIGHT: 181 LBS | BODY MASS INDEX: 33.31 KG/M2

## 2023-06-06 DIAGNOSIS — F41.9 ANXIETY: ICD-10-CM

## 2023-06-06 DIAGNOSIS — Z12.11 COLON CANCER SCREENING: ICD-10-CM

## 2023-06-06 DIAGNOSIS — Z00.00 ENCOUNTER FOR ROUTINE ADULT HEALTH EXAMINATION WITHOUT ABNORMAL FINDINGS: Primary | ICD-10-CM

## 2023-06-06 DIAGNOSIS — Z12.31 VISIT FOR SCREENING MAMMOGRAM: ICD-10-CM

## 2023-06-06 DIAGNOSIS — R21 RASH AND NONSPECIFIC SKIN ERUPTION: ICD-10-CM

## 2023-06-06 DIAGNOSIS — Z12.4 PAP SMEAR FOR CERVICAL CANCER SCREENING: ICD-10-CM

## 2023-06-06 PROCEDURE — 99396 PREV VISIT EST AGE 40-64: CPT | Performed by: FAMILY MEDICINE

## 2023-06-06 PROCEDURE — 3079F DIAST BP 80-89 MM HG: CPT | Performed by: FAMILY MEDICINE

## 2023-06-06 PROCEDURE — 99213 OFFICE O/P EST LOW 20 MIN: CPT | Performed by: FAMILY MEDICINE

## 2023-06-06 PROCEDURE — G0438 PPPS, INITIAL VISIT: HCPCS | Performed by: FAMILY MEDICINE

## 2023-06-06 PROCEDURE — 87624 HPV HI-RISK TYP POOLED RSLT: CPT | Performed by: FAMILY MEDICINE

## 2023-06-06 PROCEDURE — 3008F BODY MASS INDEX DOCD: CPT | Performed by: FAMILY MEDICINE

## 2023-06-06 PROCEDURE — 3075F SYST BP GE 130 - 139MM HG: CPT | Performed by: FAMILY MEDICINE

## 2023-06-06 RX ORDER — SERTRALINE HCL 100 MG
100 TABLET ORAL DAILY
Qty: 90 TABLET | Refills: 1 | Status: SHIPPED | OUTPATIENT
Start: 2023-06-06 | End: 2023-06-08

## 2023-06-06 RX ORDER — MONTELUKAST SODIUM 10 MG/1
10 TABLET ORAL NIGHTLY
Qty: 90 TABLET | Refills: 1 | Status: SHIPPED | OUTPATIENT
Start: 2023-06-06

## 2023-06-07 ENCOUNTER — TELEPHONE (OUTPATIENT)
Facility: CLINIC | Age: 53
End: 2023-06-07

## 2023-06-07 LAB — HPV I/H RISK 1 DNA SPEC QL NAA+PROBE: NEGATIVE

## 2023-06-07 NOTE — TELEPHONE ENCOUNTER
Prior authorization for zoloft was done through [facility].  It can take 1-5 business days for a decision to come back

## 2023-06-08 RX ORDER — SERTRALINE HCL 100 MG
100 TABLET ORAL DAILY
Qty: 90 TABLET | Refills: 3 | Status: SHIPPED | OUTPATIENT
Start: 2023-06-08

## 2023-06-08 NOTE — TELEPHONE ENCOUNTER
Pt is calling requesting a medication(ZOLOFT 100 MG Oral Tab) be sent over to another pharmacy due to the price and if it could be sent to a pharmacy in [facility]. please follow up with pt.

## 2023-06-08 NOTE — TELEPHONE ENCOUNTER
Patient would like the [place] we cannot sent to out of country pharmacies. Insisted this is to be done as its done for her mother with the [facility] healthcare system    Unable to locate pharmacy in [facility].      [facility] in [facility]: [phone]  F: [phone]    Please advise

## 2023-06-15 LAB — LAST PAP RESULT: NORMAL

## 2023-06-26 ENCOUNTER — HOSPITAL ENCOUNTER (OUTPATIENT)
Dept: CT IMAGING | Age: 53
Discharge: HOME | End: 2023-06-26
Attending: INTERNAL MEDICINE
Payer: COMMERCIAL

## 2023-06-26 DIAGNOSIS — R59.0 MEDIASTINAL LYMPHADENOPATHY: ICD-10-CM

## 2023-06-26 LAB
CREAT BLD-MCNC: 0.8 MG/DL
GFR SERPLBLD BASED ON 1.73 SQ M-ARVRAT: 88 ML/MIN/1.73M2 (ref 60–?)

## 2023-06-26 PROCEDURE — 82565 ASSAY OF CREATININE: CPT

## 2023-06-26 PROCEDURE — 71260 CT THORAX DX C+: CPT | Performed by: INTERNAL MEDICINE

## 2023-06-28 ENCOUNTER — OFFICE VISIT (OUTPATIENT)
Dept: DERMATOLOGY CLINIC | Facility: CLINIC | Age: 53
End: 2023-06-28

## 2023-06-28 ENCOUNTER — TELEPHONE (OUTPATIENT)
Dept: PULMONOLOGY | Facility: CLINIC | Age: 53
End: 2023-06-28

## 2023-06-28 DIAGNOSIS — R21 RASH AND NONSPECIFIC SKIN ERUPTION: Primary | ICD-10-CM

## 2023-06-28 PROCEDURE — 99213 OFFICE O/P EST LOW 20 MIN: CPT | Performed by: STUDENT IN AN ORGANIZED HEALTH CARE EDUCATION/TRAINING PROGRAM

## 2023-07-09 PROCEDURE — 82274 ASSAY TEST FOR BLOOD FECAL: CPT

## 2023-07-10 ENCOUNTER — LAB ENCOUNTER (OUTPATIENT)
Dept: LAB | Facility: REFERENCE LAB | Age: 53
End: 2023-07-10
Attending: FAMILY MEDICINE
Payer: COMMERCIAL

## 2023-07-10 ENCOUNTER — HOSPITAL ENCOUNTER (OUTPATIENT)
Dept: MAMMOGRAPHY | Age: 53
Discharge: HOME | End: 2023-07-10
Attending: FAMILY MEDICINE
Payer: COMMERCIAL

## 2023-07-10 ENCOUNTER — HOSPITAL ENCOUNTER (OUTPATIENT)
Dept: RESPIRATORY THERAPY | Facility: HOSPITAL | Age: 53
Discharge: HOME | End: 2023-07-10
Attending: INTERNAL MEDICINE
Payer: COMMERCIAL

## 2023-07-10 DIAGNOSIS — Z12.31 VISIT FOR SCREENING MAMMOGRAM: ICD-10-CM

## 2023-07-10 DIAGNOSIS — R06.00 DYSPNEA, UNSPECIFIED TYPE: ICD-10-CM

## 2023-07-10 DIAGNOSIS — Z12.11 COLON CANCER SCREENING: ICD-10-CM

## 2023-07-10 LAB — HEMOCCULT STL QL: NEGATIVE

## 2023-07-10 PROCEDURE — 94726 PLETHYSMOGRAPHY LUNG VOLUMES: CPT | Performed by: INTERNAL MEDICINE

## 2023-07-10 PROCEDURE — 77067 SCR MAMMO BI INCL CAD: CPT | Performed by: FAMILY MEDICINE

## 2023-07-10 PROCEDURE — 94060 EVALUATION OF WHEEZING: CPT | Performed by: INTERNAL MEDICINE

## 2023-07-10 PROCEDURE — 77063 BREAST TOMOSYNTHESIS BI: CPT | Performed by: FAMILY MEDICINE

## 2023-07-10 PROCEDURE — 94729 DIFFUSING CAPACITY: CPT | Performed by: INTERNAL MEDICINE

## 2023-07-11 NOTE — PROCEDURES
[facility]     Pulmonary Function Test     Magdi Elkins Patient Status:  Outpatient    1970 MRN [id number]   Date of Exam 7/10/23 PCP Vanesa Amador DO           Spirometry   FEV1: 2.63 106%  FVC: 3.39 110%  FEV1/FVC: 0.77    Lung Volume   T.01 106%  RV : 1.62 92%    Diffusion Capacity   DLCO: 24.0 112%    Flow Volume Loop       Impression   No significant obstructive defect seen without significant postbronchodilator response observed. Normal lung volumes. Normal diffusion capacity.     Ben Chen DO  Pulmonary [address] [facility]

## 2023-09-18 ENCOUNTER — NURSE TRIAGE (OUTPATIENT)
Facility: CLINIC | Age: 53
End: 2023-09-18

## 2023-09-18 NOTE — TELEPHONE ENCOUNTER
Patient stated that she saw Dr Dewayne Mohs on 4/7/2023 for right upper quadrant abdominal pain, because Dr Sunita Marie had no availability. Patient never got the ultrasound done because the abdominal pain went away. Patient stated that abdominal pain returned a few days ago in the same area and wanted to know if ok to get the ultrasound done. Has alittle nausea. No other symptoms. Advised patient that order is still valid just needs to call central scheduling to get the test scheduled. Advised if any fevers or symptoms worse to go to the ER. Patient verbalized understanding.

## 2023-09-19 ENCOUNTER — HOSPITAL ENCOUNTER (OUTPATIENT)
Age: 53
Discharge: ANOTHER HOSPITAL | End: 2023-09-19
Attending: EMERGENCY MEDICINE
Payer: COMMERCIAL

## 2023-09-19 ENCOUNTER — HOSPITAL ENCOUNTER (EMERGENCY)
Facility: HOSPITAL | Age: 53
Discharge: HOME | End: 2023-09-19
Attending: EMERGENCY MEDICINE
Payer: COMMERCIAL

## 2023-09-19 ENCOUNTER — APPOINTMENT (OUTPATIENT)
Dept: CT IMAGING | Facility: HOSPITAL | Age: 53
End: 2023-09-19
Attending: EMERGENCY MEDICINE
Payer: COMMERCIAL

## 2023-09-19 VITALS
WEIGHT: 183 LBS | HEIGHT: 62 IN | SYSTOLIC BLOOD PRESSURE: 128 MMHG | BODY MASS INDEX: 33.68 KG/M2 | OXYGEN SATURATION: 98 % | DIASTOLIC BLOOD PRESSURE: 83 MMHG | RESPIRATION RATE: 18 BRPM | TEMPERATURE: 98 F | HEART RATE: 67 BPM

## 2023-09-19 VITALS
TEMPERATURE: 97 F | BODY MASS INDEX: 33.68 KG/M2 | HEART RATE: 89 BPM | WEIGHT: 183 LBS | OXYGEN SATURATION: 99 % | DIASTOLIC BLOOD PRESSURE: 83 MMHG | SYSTOLIC BLOOD PRESSURE: 132 MMHG | RESPIRATION RATE: 20 BRPM | HEIGHT: 62 IN

## 2023-09-19 DIAGNOSIS — R10.11 ABDOMINAL PAIN, RIGHT UPPER QUADRANT: Primary | ICD-10-CM

## 2023-09-19 DIAGNOSIS — R10.10 UPPER ABDOMINAL PAIN: Primary | ICD-10-CM

## 2023-09-19 LAB
ALBUMIN SERPL-MCNC: 4.1 G/DL (ref 3.4–5)
ALBUMIN/GLOB SERPL: 1.1 {RATIO} (ref 1–2)
ALP LIVER SERPL-CCNC: 106 U/L
ALT SERPL-CCNC: 27 U/L
ANION GAP SERPL CALC-SCNC: 4 MMOL/L (ref 0–18)
AST SERPL-CCNC: 18 U/L (ref 15–37)
BASOPHILS # BLD AUTO: 0.03 X10(3) UL (ref 0–0.2)
BASOPHILS NFR BLD AUTO: 0.4 %
BILIRUB SERPL-MCNC: 0.6 MG/DL (ref 0.1–2)
BILIRUB UR QL STRIP: NEGATIVE
BUN BLD-MCNC: 17 MG/DL (ref 7–18)
BUN/CREAT SERPL: 23 (ref 10–20)
CALCIUM BLD-MCNC: 9.6 MG/DL (ref 8.5–10.1)
CHLORIDE SERPL-SCNC: 110 MMOL/L (ref 98–112)
CLARITY UR: CLEAR
CO2 SERPL-SCNC: 27 MMOL/L (ref 21–32)
CREAT BLD-MCNC: 0.74 MG/DL
DEPRECATED RDW RBC AUTO: 38.5 FL (ref 35.1–46.3)
EGFRCR SERPLBLD CKD-EPI 2021: 96 ML/MIN/1.73M2 (ref 60–?)
EOSINOPHIL # BLD AUTO: 0.21 X10(3) UL (ref 0–0.7)
EOSINOPHIL NFR BLD AUTO: 2.5 %
ERYTHROCYTE [DISTWIDTH] IN BLOOD BY AUTOMATED COUNT: 12 % (ref 11–15)
GLOBULIN PLAS-MCNC: 3.7 G/DL (ref 2.8–4.4)
GLUCOSE BLD-MCNC: 122 MG/DL (ref 70–99)
GLUCOSE UR STRIP-MCNC: NEGATIVE MG/DL
HCT VFR BLD AUTO: 40.4 %
HGB BLD-MCNC: 14.3 G/DL
HGB UR QL STRIP: NEGATIVE
IMM GRANULOCYTES # BLD AUTO: 0.02 X10(3) UL (ref 0–1)
IMM GRANULOCYTES NFR BLD: 0.2 %
KETONES UR STRIP-MCNC: NEGATIVE MG/DL
LEUKOCYTE ESTERASE UR QL STRIP: NEGATIVE
LIPASE SERPL-CCNC: 25 U/L (ref 13–75)
LYMPHOCYTES # BLD AUTO: 2.45 X10(3) UL (ref 1–4)
LYMPHOCYTES NFR BLD AUTO: 29.6 %
MCH RBC QN AUTO: 30.8 PG (ref 26–34)
MCHC RBC AUTO-ENTMCNC: 35.4 G/DL (ref 31–37)
MCV RBC AUTO: 87.1 FL
MONOCYTES # BLD AUTO: 0.44 X10(3) UL (ref 0.1–1)
MONOCYTES NFR BLD AUTO: 5.3 %
NEUTROPHILS # BLD AUTO: 5.12 X10 (3) UL (ref 1.5–7.7)
NEUTROPHILS # BLD AUTO: 5.12 X10(3) UL (ref 1.5–7.7)
NEUTROPHILS NFR BLD AUTO: 62 %
NITRITE UR QL STRIP: NEGATIVE
OSMOLALITY SERPL CALC.SUM OF ELEC: 295 MOSM/KG (ref 275–295)
PH UR STRIP: 5.5 [PH]
PLATELET # BLD AUTO: 154 10(3)UL (ref 150–450)
POTASSIUM SERPL-SCNC: 3.7 MMOL/L (ref 3.5–5.1)
PROT SERPL-MCNC: 7.8 G/DL (ref 6.4–8.2)
PROT UR STRIP-MCNC: NEGATIVE MG/DL
RBC # BLD AUTO: 4.64 X10(6)UL
SODIUM SERPL-SCNC: 141 MMOL/L (ref 136–145)
SP GR UR STRIP: 1.02
UROBILINOGEN UR STRIP-ACNC: <2 MG/DL
WBC # BLD AUTO: 8.3 X10(3) UL (ref 4–11)

## 2023-09-19 PROCEDURE — 99284 EMERGENCY DEPT VISIT MOD MDM: CPT

## 2023-09-19 PROCEDURE — 36415 COLL VENOUS BLD VENIPUNCTURE: CPT

## 2023-09-19 PROCEDURE — 81002 URINALYSIS NONAUTO W/O SCOPE: CPT

## 2023-09-19 PROCEDURE — 83690 ASSAY OF LIPASE: CPT

## 2023-09-19 PROCEDURE — 85025 COMPLETE CBC W/AUTO DIFF WBC: CPT

## 2023-09-19 PROCEDURE — 85025 COMPLETE CBC W/AUTO DIFF WBC: CPT | Performed by: EMERGENCY MEDICINE

## 2023-09-19 PROCEDURE — 74177 CT ABD & PELVIS W/CONTRAST: CPT | Performed by: EMERGENCY MEDICINE

## 2023-09-19 PROCEDURE — 80053 COMPREHEN METABOLIC PANEL: CPT

## 2023-09-19 PROCEDURE — 80053 COMPREHEN METABOLIC PANEL: CPT | Performed by: EMERGENCY MEDICINE

## 2023-09-19 PROCEDURE — 99213 OFFICE O/P EST LOW 20 MIN: CPT

## 2023-09-19 PROCEDURE — 99284 EMERGENCY DEPT VISIT MOD MDM: CPT | Performed by: EMERGENCY MEDICINE

## 2023-09-19 PROCEDURE — 83690 ASSAY OF LIPASE: CPT | Performed by: EMERGENCY MEDICINE

## 2023-09-19 RX ORDER — PANTOPRAZOLE SODIUM 20 MG/1
20 TABLET, DELAYED RELEASE ORAL DAILY
Qty: 30 TABLET | Refills: 0 | Status: SHIPPED | OUTPATIENT
Start: 2023-09-19 | End: 2023-10-19

## 2023-09-19 NOTE — ED INITIAL ASSESSMENT (HPI)
Pt to ED from [facility] with c/o atraumatic right upper quadrant pain since Saturday. +nausea without vomiting. Denies fever or cough.

## 2023-09-19 NOTE — ED INITIAL ASSESSMENT (HPI)
Pt presents to the IC with c/o RUQ and epigastric pain intermittently since Saturday. Hx of kidney stones, but per the patient, this feels different. +nausea and bloating without emesis. Diarrhea yesterday, none since.

## 2023-09-20 ENCOUNTER — PATIENT OUTREACH (OUTPATIENT)
Dept: CASE MANAGEMENT | Age: 53
End: 2023-09-20

## 2023-09-21 ENCOUNTER — TELEPHONE (OUTPATIENT)
Facility: CLINIC | Age: 53
End: 2023-09-21

## 2023-09-21 DIAGNOSIS — R10.10 UPPER ABDOMINAL PAIN: Primary | ICD-10-CM

## 2023-09-21 DIAGNOSIS — R16.1 SPLENOMEGALY: ICD-10-CM

## 2023-09-21 NOTE — TELEPHONE ENCOUNTER
If the follow-up is just for abdominal pain then she should be seeing her GI instead for a follow-up visit. Referral to Dr. Olmedo Fruit place as she has seen him in the past, but should be seen within the week so first available would be fine as well.  Please assist.

## 2023-09-21 NOTE — TELEPHONE ENCOUNTER
Chapin Mendoza from central scheduling is calling in regards to the pt a follow up appt due to a er visit on 9/19/2023 for abdominal pain and bloating and requesting a sooner appt. Please advise.

## 2023-09-21 NOTE — PROGRESS NOTES
2nd attempt ED f/up apt request     Marshal Ballard  PCP  [address] [facility]   [phone]  Office to call -availability    Confirmed w/ pt  Closing encounter

## 2023-09-21 NOTE — TELEPHONE ENCOUNTER
Recommend ultrasound of the spleen as that is a better way to evaluate for splenomegaly and order placed.

## 2023-09-21 NOTE — TELEPHONE ENCOUNTER
Left a detailed message (per MANUELA) with Dr. Audelia Finley recommendation. Provided information to call Central Scheduling at [phone]. Advised to call back with any questions.

## 2023-09-21 NOTE — TELEPHONE ENCOUNTER
Spoke with the patient,verified full name and     Informed her of message and instructions below,    She will follow up with Dr. Malachi Paredes office.     Was concern about ct scan stated \"borderline splenomegaly 12.8 \"

## 2023-09-22 ENCOUNTER — HOSPITAL ENCOUNTER (OUTPATIENT)
Dept: ULTRASOUND IMAGING | Age: 53
Discharge: HOME | End: 2023-09-22
Attending: FAMILY MEDICINE
Payer: COMMERCIAL

## 2023-09-22 DIAGNOSIS — R16.1 SPLENOMEGALY: ICD-10-CM

## 2023-09-22 PROCEDURE — 76705 ECHO EXAM OF ABDOMEN: CPT | Performed by: FAMILY MEDICINE

## 2023-09-24 DIAGNOSIS — R16.1 SPLENOMEGALY: Primary | ICD-10-CM

## 2023-09-25 ENCOUNTER — OFFICE VISIT (OUTPATIENT)
Facility: CLINIC | Age: 53
End: 2023-09-25

## 2023-09-25 VITALS — WEIGHT: 183 LBS | HEIGHT: 62 IN | BODY MASS INDEX: 33.68 KG/M2 | RESPIRATION RATE: 20 BRPM

## 2023-09-25 DIAGNOSIS — I77.4 CELIAC ARTERY COMPRESSION SYNDROME (HCC): Primary | ICD-10-CM

## 2023-09-26 ENCOUNTER — LAB ENCOUNTER (OUTPATIENT)
Dept: LAB | Facility: REFERENCE LAB | Age: 53
End: 2023-09-26
Attending: FAMILY MEDICINE
Payer: COMMERCIAL

## 2023-09-26 DIAGNOSIS — Z00.00 ENCOUNTER FOR ROUTINE ADULT HEALTH EXAMINATION WITHOUT ABNORMAL FINDINGS: ICD-10-CM

## 2023-09-26 DIAGNOSIS — R16.1 SPLENOMEGALY: ICD-10-CM

## 2023-09-26 LAB
CHOLEST SERPL-MCNC: 304 MG/DL (ref ?–200)
CRP SERPL-MCNC: <0.29 MG/DL (ref ?–0.3)
EST. AVERAGE GLUCOSE BLD GHB EST-MCNC: 105 MG/DL (ref 68–126)
FASTING PATIENT LIPID ANSWER: NO
HBA1C MFR BLD: 5.3 % (ref ?–5.7)
HDLC SERPL-MCNC: 43 MG/DL (ref 40–59)
HETEROPH AB SER QL: NEGATIVE
LDLC SERPL CALC-MCNC: 209 MG/DL (ref ?–100)
NONHDLC SERPL-MCNC: 261 MG/DL (ref ?–130)
RHEUMATOID FACT SERPL-ACNC: <10 IU/ML (ref ?–15)
TRIGL SERPL-MCNC: 261 MG/DL (ref 30–149)
VLDLC SERPL CALC-MCNC: 58 MG/DL (ref 0–30)

## 2023-09-26 PROCEDURE — 86140 C-REACTIVE PROTEIN: CPT

## 2023-09-26 PROCEDURE — 36415 COLL VENOUS BLD VENIPUNCTURE: CPT

## 2023-09-26 PROCEDURE — 86780 TREPONEMA PALLIDUM: CPT

## 2023-09-26 PROCEDURE — 87389 HIV-1 AG W/HIV-1&-2 AB AG IA: CPT

## 2023-09-26 PROCEDURE — 83036 HEMOGLOBIN GLYCOSYLATED A1C: CPT

## 2023-09-26 PROCEDURE — 80061 LIPID PANEL: CPT

## 2023-09-26 PROCEDURE — 86431 RHEUMATOID FACTOR QUANT: CPT

## 2023-09-26 PROCEDURE — 86308 HETEROPHILE ANTIBODY SCREEN: CPT

## 2023-09-27 LAB — T PALLIDUM AB SER QL: NEGATIVE

## 2023-09-27 NOTE — TELEPHONE ENCOUNTER
Patient was seen at [address] [place] [address] on 9/19/2023. Disposition and Plan      Clinical Impression:  Upper abdominal pain  (primary encounter diagnosis)      Disposition:  There is no disposition on file for this visit. There is no disposition time on file for this visit.      Follow-up:  Ravi Winters, [address] [facility]  [id number]  [phone]     Follow up in 1 week(s)                 Medications Prescribed:  Current Discharge Medication List

## 2023-10-11 NOTE — PROGRESS NOTES
[facility], [facility] - Gastroenterology                                                                                                               Reason for consult: f/u    Requesting physician or provider: Charlotta Habermann, DO    Patient presents with:  Pain: Right side    Bloating      HPI:   Ajay Pena is a 46year old year-old female with history of kidney stone, abnormal pap smear, depression, HPV, seasonal allergies, SOB:     she is here today for f/u  Last visit 2020  Plan for cln-cancelled 2/2 dyspnea, sob     Sees Dr. Peace Tejeda (cards)    Was having ruq pain, epigastric pain x last 1-2 years  Symptoms intermittent  Seemingly at random  Pain lasts minutes and then resolves  Will have diarrhea following pain  Will have 2 loose bms  Has chronic nausea. No vomiting    Ct a/p 9/19/23 without finding to explain symptoms  Normal Liver  Normal GB  Normal pancreas  Noted splenomegaly    Had f/u ultrasound spleen also c/w splenomegaly  Mono testing neg  Plt normal    -noted ct a/p 2018 spleen borderline in size  Ultrasound 2020 spleen estimated to be 12.6 cm    she moves has both constipation and diarrhea. Feels bloated. she denies brbpr and/or melena. Gerd new x last 1-2 mos. she denies dysphagia, odynophagia and/or globus. Appetite less. Weight stable she says.       NSAIDS:no  Tobacco: pack every 2 days  Alcohol:social  Illicit drugs:no     Maternal grandfather had gastric ca     No history of adverse reaction to sedation  No CYNTHIA  No anticoagulants  No pacemaker/defibrillator  No pain medications and/or sleep aides     FIT neg 7/2023  Last colonoscopy: no  Last EGD: 4/2018 and biopsies of duodenum and stomach unremarkable    Wt Readings from Last 6 Encounters:  10/17/23 : 180 lb (81.6 kg)  09/25/23 : 183 lb (83 kg)  09/19/23 : 183 lb (83 kg)  09/19/23 : 183 lb (83 kg)  06/06/23 : 181 lb (82.1 kg)  05/25/23 : 176 lb (79.8 kg)       History, Medications, Allergies, ROS:      Past Medical History:   Diagnosis Date    Abnormal Pap smear of cervix     Allergic rhinitis     Anxiety     Asthma     Depression     Human papilloma virus infection     Hyperlipidemia     Kidney stone 2016    Seasonal allergies     SOB (shortness of breath)       Past Surgical History:   Procedure Laterality Date    BREAST PROSTHESIS NOS Bilateral 2000    saline    BREAST RECONSTRUCTION      breast aug 2012    COLPOSCOPY, CERVIX W/UPPER ADJACENT VAGINA; W/BIOPSY(S), CERVIX      LEEP      LITHOTRIPSY  2016    Kidney stone     OTHER SURGICAL HISTORY      COSMETIC SURGERY       Family Hx:   Family History   Problem Relation Age of Onset    Other (Hepatitis C) Father     Stroke Mother     Diabetes Mother     Lipids Mother         Cholesterol    Heart Disorder Mother         Valve replacement     Other (Autoimmune necrotizing myopathy) Mother         Due to statin     Hypertension Mother     Stroke Maternal Grandmother     Cancer Maternal Grandfather         Stomach      Social History:   Social History     Socioeconomic History    Marital status:    Tobacco Use    Smoking status: Every Day     Packs/day: 0.50     Years: 8.00     Additional pack years: 0.00     Total pack years: 4.00     Types: Cigarettes    Smokeless tobacco: Never   Vaping Use    Vaping Use: Never used   Substance and Sexual Activity    Alcohol use: Yes     Comment: social drinker    Drug use: No    Sexual activity: Yes     Partners: Male   Other Topics Concern    Exercise No    Weight Concern Yes    Reaction to local anesthetic No    Pt has a pacemaker No    Pt has a defibrillator No        Medications (Active prior to today's visit):  Current Outpatient Medications   Medication Sig Dispense Refill    Multiple Vitamin (MULTI-VITAMIN) Oral Tab multivitamin tablet, [RxNorm: 0]      pantoprazole 40 MG Oral Tab EC Take 1 tablet (40 mg total) by mouth daily.  30 tablet 3    PEG 3350-KCl-Na Bicarb-NaCl (TRILYTE) 420 g Oral Recon Soln Take prep as directed by gastro office. May substitute with Trilyte/generic equivalent if needed. 4000 mL 0    ZOLOFT 100 MG Oral Tab Take 1 tablet (100 mg total) by mouth daily. 90 tablet 3    montelukast 10 MG Oral Tab Take 1 tablet (10 mg total) by mouth nightly. 90 tablet 1    Multiple Vitamins-Minerals (MULTIVITAL) Oral Tab Take 1 tablet by mouth daily. pantoprazole 20 MG Oral Tab EC Take 1 tablet (20 mg total) by mouth daily. (Patient not taking: Reported on 10/17/2023) 30 tablet 0    clobetasol 0.05 % External Ointment Twice daily to affected areas for next 4 weeks. Do not use on face. 60 g 3       Allergies:    Aspirin                 SWELLING    ROS:   CONSTITUTIONAL: negative for fevers, chills, sweats and weight loss  EYES Negative for red eyes, yellow eyes, changes in vision  HEENT: Negative for dysphagia and hoarseness  RESPIRATORY: Negative for cough and shortness of breath  CARDIOVASCULAR: Negative for chest pain, palpitations  GASTROINTESTINAL: See HPI  GENITOURINARY: Negative for dysuria and frequency  MUSCULOSKELETAL: Negative for arthralgias and myalgias  NEUROLOGICAL: Negative for dizziness and headaches  BEHAVIOR/PSYCH: Negative for anxiety and poor appetite    PHYSICAL EXAM:   Blood pressure 130/81, pulse 98, height 5' 2\" (1.575 m), weight 180 lb (81.6 kg), last menstrual period 02/05/2018, not currently breastfeeding. GEN: WD/WN, NAD  HEENT: Supple symmetrical, trachea midline  CV: RRR, the extremities are warm and well perfused   LUNGS: No increased work of breathing  ABDOMEN: No scars, normal bowel sounds, soft, non-tender, non-distended no rebound or guarding, no masses, no hepatomegaly  MSK: No redness, no warmth, no swelling of joints  SKIN: No jaundice, no erythema, no rashes  HEMATOLOGIC: No bleeding, no bruising  NEURO: Alert and interactive, normal gait    Labs/Imaging/Procedures:     Patient's pertinent labs and imaging were reviewed and discussed with patient today. Marie Amos ASSESSMENT/PLAN:   Bibiana Zhao is a 46year old year-old female with history of kidney stone, abnormal pap smear, depression, HPV, seasonal allergies, SOB:     #RUQ pain  #splenomegaly  #diarrhea  #gerd  #nausea  #bloating  Ct a/p without finding to explain symptoms. Normal liver, lfts. Noted splenomegaly. Plt normal.  Recommend considering hematology w/u with pcp. Diarrhea typically follows pain and she reports nausea, bloating,  gerd as well. Last egd 2018 and had neg duodenal and gastric biopsies. Has not had a cln. Noted neg fit 7/2023. No fdr w/ gi malignancy. Maternal gf had gastric ca. Plan as below:    -consider hematology consult with PCP  -reflux diet modification  -start pantoprazole  -fibercon or citrucel  -hida    Cards clearance from Dr. Jayla Ambriz prior to procedure    1. Schedule colonoscopy/egd with MAC w/ Dr. Jennifer Prater: ruq pain, nausea, gerd, diarrhea, constipation, crc screening]    2.  bowel prep from pharmacy (split trilyte)    3. Continue all medications prior to procedure    4. Read all bowel prep instructions carefully    5. AVOID seeds, nuts, popcorn, raw fruits and vegetables (cooked is okay) for 2-3 days before procedure    6. You MAY need to go for COVID testing 72 hours before procedure. The testing team will call you a few days before your procedure to discuss with you if testing is required. If you are asked to go for COVID testing and do not completed the test, the procedure cannot be performed. 7. If you start any NEW medication after your visit today, please notify us. Certain medications will need to be held before the procedure, or the procedure cannot be performed.     >>>Please note: if you were prescribed Suprep for the bowel prep and it is too expensive or not covered by insurance, it is okay to substitute Trilyte (or any similar generic prep). This can be done by notifying the pharmacy or calling our office.       Orders This Visit:  No orders of the defined types were placed in this encounter. Meds This Visit:  Requested Prescriptions     Signed Prescriptions Disp Refills    pantoprazole 40 MG Oral Tab EC 30 tablet 3     Sig: Take 1 tablet (40 mg total) by mouth daily. PEG 3350-KCl-Na Bicarb-NaCl (TRILYTE) 420 g Oral Recon Soln 4000 mL 0     Sig: Take prep as directed by gastro office. May substitute with Trilyte/generic equivalent if needed. Imaging & Referrals:  [facility] HEPATOBILIARY SCAN WITH PHARMACY  (NDG=[id number])    ENDOSCOPIC RISK BENEFIT DISCUSSION: I described the procedure in great detail with the patient. I discussed the risks and benefits, including but not limited to: bleeding, perforation, infection, anesthesia complications, and even death. Patient will be NPO after midnight and will have a person physically present at time of pick-up to drive patient home. Patient verbalized understanding and agrees to proceed with procedure as planned. Stormy Carty. Jc Solid, APRN   10/17/2023        This note was partially prepared using [employer] [facility] [employer] voice recognition dictation software. As a result, errors may occur. When identified, these errors have been corrected.  While every attempt is made to correct errors during dictation, discrepancies may still exist.

## 2023-10-17 ENCOUNTER — TELEPHONE (OUTPATIENT)
Dept: GASTROENTEROLOGY | Facility: CLINIC | Age: 53
End: 2023-10-17

## 2023-10-17 ENCOUNTER — OFFICE VISIT (OUTPATIENT)
Facility: CLINIC | Age: 53
End: 2023-10-17
Payer: COMMERCIAL

## 2023-10-17 ENCOUNTER — TELEPHONE (OUTPATIENT)
Facility: CLINIC | Age: 53
End: 2023-10-17

## 2023-10-17 VITALS
SYSTOLIC BLOOD PRESSURE: 130 MMHG | HEART RATE: 98 BPM | DIASTOLIC BLOOD PRESSURE: 81 MMHG | HEIGHT: 62 IN | BODY MASS INDEX: 33.13 KG/M2 | WEIGHT: 180 LBS

## 2023-10-17 DIAGNOSIS — K21.9 GASTROESOPHAGEAL REFLUX DISEASE, UNSPECIFIED WHETHER ESOPHAGITIS PRESENT: ICD-10-CM

## 2023-10-17 DIAGNOSIS — R16.1 SPLENOMEGALY: ICD-10-CM

## 2023-10-17 DIAGNOSIS — R19.7 DIARRHEA, UNSPECIFIED TYPE: ICD-10-CM

## 2023-10-17 DIAGNOSIS — R11.0 NAUSEA: ICD-10-CM

## 2023-10-17 DIAGNOSIS — R10.11 RUQ PAIN: Primary | ICD-10-CM

## 2023-10-17 DIAGNOSIS — Z12.11 SCREENING FOR COLORECTAL CANCER: ICD-10-CM

## 2023-10-17 DIAGNOSIS — R14.0 BLOATING: ICD-10-CM

## 2023-10-17 DIAGNOSIS — Z12.12 SCREENING FOR COLORECTAL CANCER: ICD-10-CM

## 2023-10-17 RX ORDER — POLYETHYLENE GLYCOL 3350, SODIUM CHLORIDE, SODIUM BICARBONATE, POTASSIUM CHLORIDE 420; 11.2; 5.72; 1.48 G/4L; G/4L; G/4L; G/4L
POWDER, FOR SOLUTION ORAL
Qty: 4000 ML | Refills: 0 | Status: SHIPPED | OUTPATIENT
Start: 2023-10-17

## 2023-10-17 RX ORDER — PANTOPRAZOLE SODIUM 40 MG/1
40 TABLET, DELAYED RELEASE ORAL DAILY
Qty: 30 TABLET | Refills: 3 | Status: SHIPPED | OUTPATIENT
Start: 2023-10-17 | End: 2023-11-16

## 2023-10-17 NOTE — TELEPHONE ENCOUNTER
Scheduled for:  Colonoscopy 57849/38225 EGD 60664  Provider Name:  Dr Jenn Engle  Date:  2/7/2024  Location:  [facility]  Sedation:  MAC  Time:  1:15 pm. (pt is aware to arrive at 12:15 pm)  Prep:  Split dose Trilyte  Meds/Allergies Reconciled?:  Margorie Bence NP, Reviewed   Diagnosis with codes:    RUQ pain R10.11  Nausea R11.0  GERD K21.9  Diarrhea R19.7  Constipation  CRC screening Z12.11  Was patient informed to call insurance with codes (Y/N):  Yes  Referral sent?:  Referral was sent at the time of electronic surgical scheduling. [facility] or [address] notified?:  I sent an electronic request to Endo Scheduling and received a confirmation today. Medication Orders:  Pt is aware to NOT take iron pills, herbal meds and diet supplements for 7 days before exam. Also to NOT take any form of alcohol, recreational drugs and any forms of ED meds 24 hours before exam.   Misc Orders:  Cards clearance from Dr. Karina Eubanks prior to procedure    Further instructions given by staff:  I discussed the prep instructions with the patient which she verbally understood. I provided patient with prep instruction's sheet in office. Patient was informed about the new cancellation policy for his/her procedure. Patient was also given a copy of the cancellation policy at the time of the appointment and verbalized understanding.

## 2023-10-18 DIAGNOSIS — R16.1 SPLENOMEGALY: Primary | ICD-10-CM

## 2023-10-18 NOTE — TELEPHONE ENCOUNTER
RN faxed request for cardiac clearance to [facility]. Received confirmation that fax was transmitted successfully.   Fax #[phone]

## 2023-10-26 ENCOUNTER — HOSPITAL ENCOUNTER (OUTPATIENT)
Dept: NUCLEAR MEDICINE | Facility: HOSPITAL | Age: 53
Discharge: HOME | End: 2023-10-26
Attending: NURSE PRACTITIONER

## 2023-10-26 DIAGNOSIS — R10.11 RUQ PAIN: ICD-10-CM

## 2023-10-26 DIAGNOSIS — R19.7 DIARRHEA, UNSPECIFIED TYPE: ICD-10-CM

## 2023-10-26 PROCEDURE — 78227 HEPATOBIL SYST IMAGE W/DRUG: CPT | Performed by: NURSE PRACTITIONER

## 2023-11-02 ENCOUNTER — OFFICE VISIT (OUTPATIENT)
Dept: HEMATOLOGY/ONCOLOGY | Facility: HOSPITAL | Age: 53
End: 2023-11-02
Attending: FAMILY MEDICINE
Payer: COMMERCIAL

## 2023-11-02 VITALS
RESPIRATION RATE: 18 BRPM | HEART RATE: 90 BPM | TEMPERATURE: 98 F | WEIGHT: 185.19 LBS | DIASTOLIC BLOOD PRESSURE: 82 MMHG | OXYGEN SATURATION: 96 % | HEIGHT: 62 IN | BODY MASS INDEX: 34.08 KG/M2 | SYSTOLIC BLOOD PRESSURE: 143 MMHG

## 2023-11-02 DIAGNOSIS — R16.1 SPLENOMEGALY: Primary | ICD-10-CM

## 2023-11-02 DIAGNOSIS — R10.12 ABDOMINAL PAIN, LUQ: ICD-10-CM

## 2023-11-02 PROCEDURE — 88185 FLOWCYTOMETRY/TC ADD-ON: CPT | Performed by: INTERNAL MEDICINE

## 2023-11-02 PROCEDURE — 81270 JAK2 GENE: CPT | Performed by: INTERNAL MEDICINE

## 2023-11-02 PROCEDURE — 86225 DNA ANTIBODY NATIVE: CPT | Performed by: INTERNAL MEDICINE

## 2023-11-02 PROCEDURE — 99211 OFF/OP EST MAY X REQ PHY/QHP: CPT

## 2023-11-02 PROCEDURE — 86803 HEPATITIS C AB TEST: CPT | Performed by: INTERNAL MEDICINE

## 2023-11-02 PROCEDURE — 86706 HEP B SURFACE ANTIBODY: CPT | Performed by: INTERNAL MEDICINE

## 2023-11-02 PROCEDURE — 83521 IG LIGHT CHAINS FREE EACH: CPT | Performed by: INTERNAL MEDICINE

## 2023-11-02 PROCEDURE — 86038 ANTINUCLEAR ANTIBODIES: CPT | Performed by: INTERNAL MEDICINE

## 2023-11-02 PROCEDURE — 80503 PATH CLIN CONSLTJ SF 5-20: CPT | Performed by: INTERNAL MEDICINE

## 2023-11-02 PROCEDURE — 86334 IMMUNOFIX E-PHORESIS SERUM: CPT | Performed by: INTERNAL MEDICINE

## 2023-11-02 PROCEDURE — 86704 HEP B CORE ANTIBODY TOTAL: CPT | Performed by: INTERNAL MEDICINE

## 2023-11-02 PROCEDURE — 84165 PROTEIN E-PHORESIS SERUM: CPT | Performed by: INTERNAL MEDICINE

## 2023-11-02 PROCEDURE — 88184 FLOWCYTOMETRY/ TC 1 MARKER: CPT | Performed by: INTERNAL MEDICINE

## 2023-11-02 PROCEDURE — 87340 HEPATITIS B SURFACE AG IA: CPT | Performed by: INTERNAL MEDICINE

## 2023-11-02 PROCEDURE — 82784 ASSAY IGA/IGD/IGG/IGM EACH: CPT | Performed by: INTERNAL MEDICINE

## 2023-11-02 PROCEDURE — 88189 FLOWCYTOMETRY/READ 16 & >: CPT | Performed by: INTERNAL MEDICINE

## 2023-11-03 LAB
CD10 CELLS NFR SPEC: <1 %
CD10/CD19: <1 %
CD19 CELLS NFR SPEC: 12 %
CD19+/CD200+: 10 %
CD2 CELLS NFR SPEC: 83 %
CD20 CELLS NFR SPEC: 12 %
CD200 CELLS: 13 %
CD3 CELLS NFR SPEC: 77 %
CD3+/TCRGD+: 1 %
CD3+CD4+ CELLS NFR SPEC: 55 %
CD3+CD4+ CELLS/CD3+CD8+ CLL SPEC: 2.6
CD3+CD8+ CELLS NFR SPEC: 21 %
CD3-/CD56+: 10 %
CD34 CELLS NFR SPEC: <1 %
CD38 CELLS NFR SPEC: 4 %
CD38+/CD19+: <1 %
CD45 CELLS NFR SPEC: 100 %
CD5 CELLS NFR SPEC: 82 %
CD5/CD19 CELLS: 4 %
CD7 CELLS NFR SPEC: 84 %
CELL SURF KAPPA/LAMBDA RATIO: 1.4
CELL SURF LAMBDA LIGHT CHAIN: 5 %
CELL SURFACE KAPPA LIGHT CHAIN: 7 %
DSDNA IGG SERPL IA-ACNC: <0.6 IU/ML
ENA AB SER QL IA: 0.2 UG/L
ENA AB SER QL IA: NEGATIVE
TCR G-D CELLS NFR SPEC: 1 %

## 2023-11-04 LAB
HBV CORE AB SERPL QL IA: NONREACTIVE
HBV SURFACE AB SER QL: NONREACTIVE
HBV SURFACE AB SERPL IA-ACNC: <3.1 MIU/ML
HBV SURFACE AG SER-ACNC: <3.1 [IU]/L
HBV SURFACE AG SERPL QL IA: NONREACTIVE
HCV AB: NON REACTIVE
IGA SERPL-MCNC: 261.9 MG/DL (ref 40–350)
IGM SERPL-MCNC: 253.1 MG/DL (ref 50–300)
IMMUNOGLOBULIN PNL SER-MCNC: 1008 MG/DL (ref 650–1600)

## 2023-11-06 LAB
ALBUMIN SERPL ELPH-MCNC: 4.6 G/DL (ref 3.75–5.21)
ALBUMIN/GLOB SERPL: 1.77 {RATIO} (ref 1–2)
ALPHA1 GLOB SERPL ELPH-MCNC: 0.27 G/DL (ref 0.19–0.46)
ALPHA2 GLOB SERPL ELPH-MCNC: 0.65 G/DL (ref 0.48–1.05)
B-GLOBULIN SERPL ELPH-MCNC: 0.83 G/DL (ref 0.68–1.23)
GAMMA GLOB SERPL ELPH-MCNC: 0.85 G/DL (ref 0.62–1.7)
KAPPA LC FREE SER-MCNC: 1.52 MG/DL (ref 0.33–1.94)
KAPPA LC FREE/LAMBDA FREE SER NEPH: 1.62 {RATIO} (ref 0.26–1.65)
LAMBDA LC FREE SERPL-MCNC: 0.94 MG/DL (ref 0.57–2.63)
PROT SERPL-MCNC: 7.2 G/DL (ref 5.7–8.2)

## 2023-11-08 ENCOUNTER — TELEPHONE (OUTPATIENT)
Dept: HEMATOLOGY/ONCOLOGY | Facility: HOSPITAL | Age: 53
End: 2023-11-08

## 2023-11-16 ENCOUNTER — OFFICE VISIT (OUTPATIENT)
Dept: HEMATOLOGY/ONCOLOGY | Facility: HOSPITAL | Age: 53
End: 2023-11-16
Attending: FAMILY MEDICINE
Payer: COMMERCIAL

## 2023-11-16 VITALS
HEART RATE: 93 BPM | RESPIRATION RATE: 18 BRPM | TEMPERATURE: 98 F | SYSTOLIC BLOOD PRESSURE: 132 MMHG | BODY MASS INDEX: 33.82 KG/M2 | HEIGHT: 62 IN | WEIGHT: 183.81 LBS | OXYGEN SATURATION: 98 % | DIASTOLIC BLOOD PRESSURE: 80 MMHG

## 2023-11-16 DIAGNOSIS — B37.2 SKIN CANDIDIASIS: ICD-10-CM

## 2023-11-16 DIAGNOSIS — R16.1 SPLENOMEGALY: Primary | ICD-10-CM

## 2023-11-16 DIAGNOSIS — R10.12 ABDOMINAL PAIN, LUQ: ICD-10-CM

## 2023-11-16 PROCEDURE — 99214 OFFICE O/P EST MOD 30 MIN: CPT | Performed by: INTERNAL MEDICINE

## 2023-11-17 RX ORDER — NYSTATIN 100000 [USP'U]/G
1 POWDER TOPICAL 4 TIMES DAILY
Qty: 30 G | Refills: 0 | Status: SHIPPED | OUTPATIENT
Start: 2023-11-17 | End: 2023-11-24

## 2023-11-20 ENCOUNTER — HOSPITAL ENCOUNTER (OUTPATIENT)
Dept: ULTRASOUND IMAGING | Age: 53
Discharge: HOME | End: 2023-11-20
Attending: INTERNAL MEDICINE
Payer: COMMERCIAL

## 2023-11-20 DIAGNOSIS — R16.1 SPLENOMEGALY: ICD-10-CM

## 2023-11-20 PROCEDURE — 93975 VASCULAR STUDY: CPT | Performed by: INTERNAL MEDICINE

## 2023-11-20 PROCEDURE — 76700 US EXAM ABDOM COMPLETE: CPT | Performed by: INTERNAL MEDICINE

## 2023-12-14 ENCOUNTER — TELEPHONE (OUTPATIENT)
Facility: CLINIC | Age: 53
End: 2023-12-14

## 2023-12-15 RX ORDER — MONTELUKAST SODIUM 10 MG/1
10 TABLET ORAL NIGHTLY
Qty: 90 TABLET | Refills: 0 | Status: SHIPPED | OUTPATIENT
Start: 2023-12-15

## 2023-12-15 NOTE — TELEPHONE ENCOUNTER
FYI: PSR is not working with Dr Jose Alfredo Steven, Please re-route  message to the proper pool, Thank you.

## 2023-12-15 NOTE — TELEPHONE ENCOUNTER
Please review; protocol failed. 90 day refill given on 12/15/23, appointment needed for further refills.       Requested Prescriptions   Pending Prescriptions Disp Refills    MONTELUKAST 10 MG Oral Tab [Pharmacy Med Name: MONTELUKAST 10MG TABLETS] 90 tablet 1     Sig: TAKE 1 TABLET(10 MG) BY MOUTH EVERY NIGHT       Asthma & COPD Medication Protocol Failed - 12/14/2023 10:57 AM        Failed - In person appointment or virtual visit in the past 6 mos or appointment in next 3 mos     Recent Outpatient Visits              4 weeks ago Splenomegaly    [facility] Hematology Oncology Arianna Mercedes MD    Office Visit    1 month ago Splenomegaly    [facility] Hematology Oncology Arianna Mercedes MD    Office Visit    1 month ago [facility], [address], Scot Yarbrough APRN    Office Visit    2 months ago Celiac artery compression syndrome [facility])    [address], [address], Yfn Wood MD    Office Visit    5 months ago Rash and nonspecific skin eruption    Jodee Bradley MD    Office Visit          Future Appointments         Provider Department Appt Notes    In 1 month [address], [address], [address], [place] Colonoscopy/EGD w/MAC @ [place]    In 5 months David Gallagher 19 Hematology Oncology Review labs                  Future Appointments         Provider Department Appt Notes    In 1 month [address], [address], [place] Colonoscopy/EGD w/MAC @ [address]    In 5 months David Gallagher 19 Hematology Oncology Review labs          Recent Outpatient Visits              4 weeks ago Splenomegaly    [facility] Hematology Oncology Arianna Mercedes MD    Office Visit    1 month ago Splenomegaly    [facility] Hematology Oncology Arianna Mercedes MD    Office Visit    1 month ago RUQ pain    [facility], [address], Ariel MAHENDRA Zepeda    Office Visit    2 months ago Celiac artery compression syndrome [facility])    [address] [facility], Humble Beckman MD    Office Visit    5 months ago Rash and nonspecific skin eruption    [address] [facility], [place] Javier Peacock MD    Office Visit

## 2023-12-17 RX ORDER — MONTELUKAST SODIUM 10 MG/1
10 TABLET ORAL NIGHTLY
Qty: 90 TABLET | Refills: 0 | Status: CANCELLED | OUTPATIENT
Start: 2023-12-17

## 2024-01-05 NOTE — TELEPHONE ENCOUNTER
RN re-faxed request for cardiac clearance to [facility]. EGD/colon scheduled for 2/7/24. Received confirmation that fax was transmitted successfully.  Fax #[phone].

## 2024-01-23 ENCOUNTER — TELEPHONE (OUTPATIENT)
Dept: OTHER | Facility: HOSPITAL | Age: 54
End: 2024-01-23

## 2024-01-23 ENCOUNTER — ORDER TRANSCRIPTION (OUTPATIENT)
Dept: ADMINISTRATIVE | Facility: HOSPITAL | Age: 54
End: 2024-01-23

## 2024-01-23 DIAGNOSIS — Z13.6 SCREENING FOR CARDIOVASCULAR CONDITION: Primary | ICD-10-CM

## 2024-01-23 NOTE — PROGRESS NOTES
Patient had LM on our RN line after she had talked with Central Scheduling to schedule the PV - Carotid Artery Screening.  In looking back, patient had the PV Screening on 8/13/2021.  Results: Left Carotid Artery Normal (1) and the Right Carotid Artery - showing moderate plaque (3). In discussing this with the patient today on the phone, I explained that we would not repeat the screening of the Carotids, that she needs to discuss with her PCP about having a Carotid Doppler done and I explained the difference between the tests.  I discussed with patient that the Left side as a 1 means no plaque build up was seen and that on the right, the 3 means that there could be greater than 50% plaque build up.  Dr. Jacinto had contacted patient after the screening in Aug 2021 with recommendations about cholesterol medication () and smoking cessation.  Patient follows with Dr. Morales and is to see Dr. Crenshaw about her cholesterol due to her mother is allergic to statins and has had major side effects.  Patient quite upset that no one has mentioned any further testing on her carotids and that I am telling her that the carotid could be greater than 50% and she was told it was 30%.  I again, tried to explain that the screening is a quick look of the carotids and that for her, she really should have the Carotid Doppler done to look at them closer and to get a better percentage range of the right side.  Patient states that she will call her PCP office and discuss.

## 2024-02-04 ENCOUNTER — HOSPITAL ENCOUNTER (OUTPATIENT)
Dept: CT IMAGING | Age: 54
Discharge: HOME | End: 2024-02-04
Attending: FAMILY MEDICINE

## 2024-02-04 DIAGNOSIS — Z13.6 SCREENING FOR CARDIOVASCULAR CONDITION: ICD-10-CM

## 2024-02-05 ENCOUNTER — PATIENT MESSAGE (OUTPATIENT)
Facility: CLINIC | Age: 54
End: 2024-02-05

## 2024-02-06 NOTE — TELEPHONE ENCOUNTER
From: Shawnee Ramirez  To: Jolly Jacinto  Sent: 2024 5:15 PM CST  Subject: Calcium Scoring    Hi thank you, glad to hear its all normal, but I cant seem to find the actual test results with the number reading of the calcium scoring test I took on , all that's listed in my chart is an overview which doesn't list any actual info or number breakdown. Do I need to contact [place] where the test was taken, or can it be loaded into my chart so I can compare to the last test I took a couple years ago.  Thank you, be well!  Shawnee Glaser  1970

## 2024-02-07 ENCOUNTER — ANESTHESIA (OUTPATIENT)
Dept: ENDOSCOPY | Age: 54
End: 2024-02-07
Payer: COMMERCIAL

## 2024-02-07 ENCOUNTER — ANESTHESIA EVENT (OUTPATIENT)
Dept: ENDOSCOPY | Age: 54
End: 2024-02-07
Payer: COMMERCIAL

## 2024-02-07 ENCOUNTER — HOSPITAL ENCOUNTER (OUTPATIENT)
Age: 54
Setting detail: HOSPITAL OUTPATIENT SURGERY
Discharge: HOME | End: 2024-02-07
Attending: INTERNAL MEDICINE | Admitting: INTERNAL MEDICINE
Payer: COMMERCIAL

## 2024-02-07 VITALS
RESPIRATION RATE: 20 BRPM | OXYGEN SATURATION: 99 % | DIASTOLIC BLOOD PRESSURE: 76 MMHG | BODY MASS INDEX: 32.94 KG/M2 | TEMPERATURE: 98 F | WEIGHT: 179 LBS | SYSTOLIC BLOOD PRESSURE: 117 MMHG | HEIGHT: 62 IN | HEART RATE: 75 BPM

## 2024-02-07 DIAGNOSIS — R19.7 DIARRHEA, UNSPECIFIED TYPE: ICD-10-CM

## 2024-02-07 DIAGNOSIS — Z12.12 SCREENING FOR COLORECTAL CANCER: ICD-10-CM

## 2024-02-07 DIAGNOSIS — R11.0 NAUSEA: ICD-10-CM

## 2024-02-07 DIAGNOSIS — K21.9 GASTROESOPHAGEAL REFLUX DISEASE, UNSPECIFIED WHETHER ESOPHAGITIS PRESENT: ICD-10-CM

## 2024-02-07 DIAGNOSIS — R10.11 RUQ PAIN: ICD-10-CM

## 2024-02-07 DIAGNOSIS — Z12.11 SCREENING FOR COLORECTAL CANCER: ICD-10-CM

## 2024-02-07 PROBLEM — K57.30 DIVERTICULA OF COLON: Status: ACTIVE | Noted: 2024-02-07

## 2024-02-07 PROBLEM — Z01.89 NORMAL ESOPHAGOGASTRODUODENOSCOPY (EGD): Status: ACTIVE | Noted: 2024-02-07

## 2024-02-07 PROCEDURE — 88305 TISSUE EXAM BY PATHOLOGIST: CPT | Performed by: INTERNAL MEDICINE

## 2024-02-07 PROCEDURE — 88312 SPECIAL STAINS GROUP 1: CPT | Performed by: INTERNAL MEDICINE

## 2024-02-07 RX ORDER — SODIUM CHLORIDE, SODIUM LACTATE, POTASSIUM CHLORIDE, CALCIUM CHLORIDE 600; 310; 30; 20 MG/100ML; MG/100ML; MG/100ML; MG/100ML
INJECTION, SOLUTION INTRAVENOUS CONTINUOUS
Status: DISCONTINUED | OUTPATIENT
Start: 2024-02-07 | End: 2024-02-07

## 2024-02-07 RX ORDER — LIDOCAINE HYDROCHLORIDE 10 MG/ML
INJECTION, SOLUTION EPIDURAL; INFILTRATION; INTRACAUDAL; PERINEURAL AS NEEDED
Status: DISCONTINUED | OUTPATIENT
Start: 2024-02-07 | End: 2024-02-07 | Stop reason: SURG

## 2024-02-07 RX ORDER — GLYCOPYRROLATE 0.2 MG/ML
INJECTION, SOLUTION INTRAMUSCULAR; INTRAVENOUS AS NEEDED
Status: DISCONTINUED | OUTPATIENT
Start: 2024-02-07 | End: 2024-02-07 | Stop reason: SURG

## 2024-02-07 RX ADMIN — GLYCOPYRROLATE 0.2 MG: 0.2 INJECTION, SOLUTION INTRAMUSCULAR; INTRAVENOUS at 13:20:00

## 2024-02-07 RX ADMIN — LIDOCAINE HYDROCHLORIDE 50 MG: 10 INJECTION, SOLUTION EPIDURAL; INFILTRATION; INTRACAUDAL; PERINEURAL at 13:20:00

## 2024-02-07 NOTE — ANESTHESIA POSTPROCEDURE EVALUATION
Patient: Shawnee Ramirez    Procedure Summary       Date: 02/07/24 Room / Location: [facility] ENDOSCOPY 02 / [facility] ENDO    Anesthesia Start: 1319 Anesthesia Stop:     Procedures:       COLONOSCOPY / ESOPHAGOGASTRODUODENOSCOPY      ESOPHAGOGASTRODUODENOSCOPY (EGD) Diagnosis:       RUQ pain      Nausea      Gastroesophageal reflux disease, unspecified whether esophagitis present      Diarrhea, unspecified type      Screening for colorectal cancer      (normal EGD, diverticulosis,)    Surgeons: BIN See MD Anesthesiologist: Judith Hernandez MD    Anesthesia Type: general, MAC ASA Status: 2            Anesthesia Type: general, MAC    Vitals Value Taken Time   /96 02/07/24 1348   Temp 98 °F (36.7 °C) 02/07/24 1348   Pulse 106 02/07/24 1348   Resp 17 02/07/24 1348   SpO2 95 % 02/07/24 1348   Vitals shown include unfiled device data.    [facility] AN Post Evaluation:   Patient Evaluated in PACU (endo)  Patient Participation: complete - patient cannot participate  Level of Consciousness: sleepy but conscious  Pain Score: 0  Pain Management: adequate  Airway Patency:patent  Dental exam unchanged from preop  Yes    Cardiovascular Status: acceptable  Respiratory Status: acceptable  Postoperative Hydration acceptable      Judith Hernandez MD  2/7/2024 1:49 PM

## 2024-02-07 NOTE — OPERATIVE REPORT
ESOPHAGOGASTRODUODENOSCOPY (EGD) & COLONOSCOPY REPORT    Shawnee Ramirez     1970 Age 53 year old   PCP Jolly Jacinto DO Endoscopist Michelle See MD     Date of procedure: 24    Procedure: EGD w/cold biopsy & Colonoscopy     Pre-operative diagnosis: Screening, GERD, bloating, RUQ pain    Post-operative diagnosis: Normal EGD, diverticulosis and internal hemorrhoids    Medications: MAC    Withdrawal time: 11 minutes    Complications: none    Procedure: Informed consent was obtained from the patient after the risks of the procedure were discussed, including but not limited to bleeding, perforation, aspiration, infection, or possibility of a missed lesion. We discussed the risks/benefits and alternatives to this procedure, as well as the planned sedation. EGD procedure: The patient was placed in the left lateral decubitus position and begun on continuous blood pressure pulse oximetry and EKG monitoring and this was maintained throughout the procedure. Once an adequate level of sedation was obtained a bite block was placed. Then the lubricated tip of the [id number] diagnostic video upper endoscope was inserted and advanced using direct visualization into the posterior pharynx and ultimately into the esophagus. Colonoscopy procedure: Once an adequate level of sedation was obtained a digital rectal exam was completed. Then the lubricated tip of the [id number] diagnostic video colonoscope was inserted and advanced without difficulty to the cecum using the CO2 insufflation technique. The cecum was identified by localizing the trifold, the appendix and the ileocecal valve. A routine second examination of the cecum/ascending colon was performed. Withdrawal was begun with thorough washing and careful examination of the colonic walls and folds. Photodocumentation was obtained. The bowel prep was good. Views of the colon were good with washing. I then carefully withdrew the instrument from the  patient who tolerated the procedure well.     Complications: None    EGD findings:      1. Esophagus: The squamocolumnar junction was noted at 40 cm and appeared regular. The diaphragmatic pinch was noted noted at 40 cm from the incisors. The esophageal mucosa appeared normal. There was no evidence of esophagitis, stricture or endoscopic evidence of Prado's esophagus.  2. Stomach: The stomach distended normally. Normal rugal folds were seen. The pylorus was patent. The gastric mucosa appeared normal s/p random biopsies. Retroflexion revealed a normal fundus and a non-patulous cardia.   3. Duodenum: The duodenal mucosa appeared normal in the 1st and 2nd portion of the duodenum. S/p random biopsies.    Colonoscopy findings:    1. No polyp(s) noted  2. Diverticulosis: mild in the left colon.  3. Terminal ileum: the visualized mucosa appeared normal.  4. The colonic mucosa throughout the colon showed normal vascular pattern, without evidence of angioectasias or inflammation.   5. A retroflexed view of the rectum revealed small internal hemorrhoids.  6. GRANT: normal rectal tone, no masses palpated.     Impression:  EGD shows no mass or ulcer. Normal appearing upper endoscopy.  Normal colonoscopy to the terminal ileum, other than small internal hemorrhoids and diverticulosis.  If biopsies are normal suspect functional dyspepsia.     Recommend:  Await pathology. Repeat CLN in 10 years. If new signs or symptoms develop, colonoscopy may need to be repeated sooner.   High fiber diet.  If biopsies are normal trial FDgard (herbal medication).  Follow-up with Ebony Herrera DNP in GI clinic in 2-4 months. Call [phone] for appt or [facility].    >>>If tissue was obtained and you have not received your pathology results either by phone or letter within 2 weeks, please call our office at [phone].    Specimens: gastric, duodenal  Blood loss: <1 ml

## 2024-02-07 NOTE — H&P
History & Physical Examination    Patient Name: Shawnee Ramirez  MRN: [id number]  CSN: [id number]  YOB: 1970    Diagnosis: Screening, nausea, bloating    Medications Prior to Admission   Medication Sig Dispense Refill Last Dose    montelukast 10 MG Oral Tab Take 1 tablet (10 mg total) by mouth nightly. 90 tablet 0 2024    Multiple Vitamin (MULTI-VITAMIN) Oral Tab multivitamin tablet, [RxNorm: 0]   2024    ZOLOFT 100 MG Oral Tab Take 1 tablet (100 mg total) by mouth daily. 90 tablet 3 2024    [] Nystatin 065708 UNIT/GM External Powder Apply 1 Application topically 4 (four) times daily for 7 days. 30 g 0     [] pantoprazole 40 MG Oral Tab EC Take 1 tablet (40 mg total) by mouth daily. (Patient not taking: Reported on 2023) 30 tablet 3     PEG 3350-KCl-Na Bicarb-NaCl (TRILYTE) 420 g Oral Recon Soln Take prep as directed by gastro office. May substitute with Trilyte/generic equivalent if needed. 4000 mL 0      Current Facility-Administered Medications   Medication Dose Route Frequency    lactated ringers infusion   Intravenous Continuous       Allergies:   Allergies   Allergen Reactions    Aspirin SWELLING       Past Medical History:   Diagnosis Date    Abnormal Pap smear of cervix     Allergic rhinitis     Anxiety     Asthma     Depression     High cholesterol     Human papilloma virus infection     Hyperlipidemia     Kidney stone     PONV (postoperative nausea and vomiting)     Seasonal allergies     SOB (shortness of breath)      Past Surgical History:   Procedure Laterality Date    BREAST PROSTHESIS NOS Bilateral     saline    BREAST RECONSTRUCTION      breast aug 2012    COLPOSCOPY, CERVIX W/UPPER ADJACENT VAGINA; W/BIOPSY(S), CERVIX      LEEP      LITHOTRIPSY  2016    Kidney stone     OTHER SURGICAL HISTORY      COSMETIC SURGERY      Family History   Problem Relation Age of Onset    Other (Hepatitis C) Father     Stroke Mother     Diabetes Mother     Lipids  Mother         Cholesterol    Heart Disorder Mother         Valve replacement     Other (Autoimmune necrotizing myopathy) Mother         Due to statin     Hypertension Mother     Stroke Maternal Grandmother     Cancer Maternal Grandfather         Stomach     Social History     Tobacco Use    Smoking status: Every Day     Packs/day: 0.50     Years: 8.00     Additional pack years: 0.00     Total pack years: 4.00     Types: Cigarettes    Smokeless tobacco: Never   Substance Use Topics    Alcohol use: Yes     Comment: social drinker         SYSTEM Check if Review is Normal Check if Physical Exam is Normal If not normal, please explain:   HEENT [X ] [ X]    NECK  [X ] [ X]    HEART [X ] [ X]    LUNGS [X ] [ X]    ABDOMEN [X ] [ X]    EXTREMITIES [X ] [ X]    OTHER        I have discussed the risks and benefits and alternatives of the procedure with the patient/family.  They understand and agree to proceed with plan of care.   I have reviewed the History and Physical done within the last 30 days.  Any changes noted above.    ACE See MD  [facility] - Gastroenterology  2/7/2024  1:20 PM

## 2024-02-07 NOTE — ANESTHESIA PREPROCEDURE EVALUATION
Anesthesia PreOp Note    HPI:     Shawnee Ramirez is a 53 year old female who presents for preoperative consultation requested by: BIN See MD    Date of Surgery: 2/7/2024    Procedure(s):  COLONOSCOPY / ESOPHAGOGASTRODUODENOSCOPY  ESOPHAGOGASTRODUODENOSCOPY (EGD)  Indication: RUQ pain / Nausea /Gastroesophageal reflux disease, unspecified whether esophagitis present /Diarrhea, unspecified type / Screening for colorectal cancer    Relevant Problems   No relevant active problems       NPO:  Last Liquid Consumption Date: 02/07/24  Last Liquid Consumption Time: 1020  Last Solid Consumption Date: 02/06/24  Last Solid Consumption Time: 1000  Last Liquid Consumption Date: 02/07/24          History Review:  Patient Active Problem List    Diagnosis Date Noted    Splenomegaly 09/24/2023    Dyspnea 07/10/2023    Anxiety 06/06/2023    Mixed hyperlipidemia 03/12/2019    Hepatic steatosis 03/12/2019    Cyst of left ovary 08/30/2018    ASCUS of cervix with negative high risk HPV 08/30/2018    Epigastric abdominal pain 04/27/2018    Dyspepsia 04/27/2018    Change in bowel function 04/27/2018    Amenorrhea, secondary 08/23/2017    History of bilateral saline breast implants 08/23/2017       Past Medical History:   Diagnosis Date    Abnormal Pap smear of cervix     Allergic rhinitis     Anxiety     Asthma     Depression     High cholesterol     Human papilloma virus infection     Hyperlipidemia     Kidney stone 2016    PONV (postoperative nausea and vomiting)     Seasonal allergies     SOB (shortness of breath)        Past Surgical History:   Procedure Laterality Date    BREAST PROSTHESIS NOS Bilateral 2000    saline    BREAST RECONSTRUCTION      breast aug 2012    COLPOSCOPY, CERVIX W/UPPER ADJACENT VAGINA; W/BIOPSY(S), CERVIX      LEEP      LITHOTRIPSY  2016    Kidney stone     OTHER SURGICAL HISTORY      COSMETIC SURGERY        Medications Prior to Admission   Medication Sig Dispense Refill Last Dose    montelukast 10  MG Oral Tab Take 1 tablet (10 mg total) by mouth nightly. 90 tablet 0 2024    Multiple Vitamin (MULTI-VITAMIN) Oral Tab multivitamin tablet, [RxNorm: 0]   2024    ZOLOFT 100 MG Oral Tab Take 1 tablet (100 mg total) by mouth daily. 90 tablet 3 2024    [] Nystatin 190885 UNIT/GM External Powder Apply 1 Application topically 4 (four) times daily for 7 days. 30 g 0     [] pantoprazole 40 MG Oral Tab EC Take 1 tablet (40 mg total) by mouth daily. (Patient not taking: Reported on 2023) 30 tablet 3     PEG 3350-KCl-Na Bicarb-NaCl (TRILYTE) 420 g Oral Recon Soln Take prep as directed by gastro office. May substitute with Trilyte/generic equivalent if needed. 4000 mL 0      Current Facility-Administered Medications Ordered in Epic   Medication Dose Route Frequency Provider Last Rate Last Admin    lactated ringers infusion   Intravenous Continuous BIN See MD         No current [facility]-ordered outpatient medications on file.       Allergies   Allergen Reactions    Aspirin SWELLING       Family History   Problem Relation Age of Onset    Other (Hepatitis C) Father     Stroke Mother     Diabetes Mother     Lipids Mother         Cholesterol    Heart Disorder Mother         Valve replacement     Other (Autoimmune necrotizing myopathy) Mother         Due to statin     Hypertension Mother     Stroke Maternal Grandmother     Cancer Maternal Grandfather         Stomach     Social History     Socioeconomic History    Marital status:    Tobacco Use    Smoking status: Every Day     Packs/day: 0.50     Years: 8.00     Additional pack years: 0.00     Total pack years: 4.00     Types: Cigarettes    Smokeless tobacco: Never   Vaping Use    Vaping Use: Never used   Substance and Sexual Activity    Alcohol use: Yes     Comment: social drinker    Drug use: No    Sexual activity: Yes     Partners: Male   Other Topics Concern    Exercise No    Weight Concern Yes    Reaction to local anesthetic No    Pt  has a pacemaker No    Pt has a defibrillator No       Available pre-op labs reviewed.             Vital Signs:  Body mass index is 32.74 kg/m².   height is 1.575 m (5' 2\") and weight is 81.2 kg (179 lb).   Vitals:    01/27/24 1135   Weight: 81.2 kg (179 lb)   Height: 1.575 m (5' 2\")        Anesthesia Evaluation     Patient summary reviewed and Nursing notes reviewed    No history of anesthetic complications   Airway   Mallampati: II  TM distance: >3 FB  Neck ROM: full  Dental      Comment: Patient denies any additional loose, missing, and chipped teeth.    Pulmonary - normal exam    breath sounds clear to auscultation  (+) asthma  (-) shortness of breath, recent URI, sleep apnea  Cardiovascular - negative ROS and normal exam  Exercise tolerance: good  (-) pacemaker, hypertension, valvular problems/murmurs, past MI, CAD, CABG/stent, dysrhythmias, angina, CHF    Rhythm: regular  Rate: normal    Neuro/Psych    (+)  anxiety/panic attacks,  depression    (-) seizures, TIA, CVA    GI/Hepatic/Renal - negative ROS   (-) GERD, liver disease, renal disease    Endo/Other - negative ROS   (-) diabetes mellitus, blood dyscrasia  Abdominal                  Anesthesia Plan:   ASA:  2  Plan:   General and MAC  Informed Consent Plan and Risks Discussed With:  Patient      I have informed Shawnee Ramirez and/or legal guardian or family member of the nature of the anesthetic plan, benefits, risks including possible dental damage if relevant, major complications, and any alternative forms of anesthetic management.   All of the patient's questions were answered to the best of my ability. The patient desires the anesthetic management as planned.  Judith Hernandez MD  2/7/2024 12:40 PM  Present on Admission:  **None**

## 2024-02-07 NOTE — DISCHARGE INSTRUCTIONS
Home Care Instructions for Colonoscopy and/or Gastroscopy with Sedation    Diet:  - Resume your regular diet as tolerated unless otherwise instructed.  - Start with light meals to minimize bloating.  - Do not drink alcohol today.    Medication:  - If you have questions about resuming your normal medications, please contact your Primary Care Physician.    Activities:  - Take it easy today. Do not return to work today.  - Do not drive today.  - Do not operate any machinery today (including kitchen equipment).    Colonoscopy:  - You may notice some rectal \"spotting\" (a little blood on the toilet tissue) for a day or two after the exam. This is normal.  - If you experience any rectal bleeding (not spotting), persistent tenderness or sharp severe abdominal pains, oral temperature over 100 degrees Fahrenheit, light-headedness or dizziness, or any other problems, contact your doctor.    Gastroscopy:  - You may have a sore throat for 2-3 days following the exam. This is normal. Gargling with warm salt water (1/2 tsp salt to 1 glass warm water) or using throat lozenges will help.  - If you experience any sharp pain in your neck, abdomen or chest, vomiting of blood, oral temperature over 100 degrees Fahrenheit, light-headedness or dizziness, or any other problems, contact your doctor.    **If unable to reach your doctor, please go to the [facility] Emergency Room**    - Your referring physician will receive a full report of your examination.  - If you do not hear from your doctor's office within two weeks of your biopsy, please call them for your results.    You may be able to see your laboratory results in [employer] between 4 and 7 business days.  In some cases, your physician may not have viewed the results before they are released to [employer].  If you have questions regarding your results contact the physician who ordered the test/exam by phone or via [employer] by choosing \"Ask a Medical Question.\"

## 2024-02-19 ENCOUNTER — HOSPITAL ENCOUNTER (OUTPATIENT)
Dept: ULTRASOUND IMAGING | Facility: HOSPITAL | Age: 54
Discharge: HOME | End: 2024-02-19
Attending: FAMILY MEDICINE
Payer: COMMERCIAL

## 2024-02-19 DIAGNOSIS — I65.21 STENOSIS OF RIGHT CAROTID ARTERY: ICD-10-CM

## 2024-02-19 PROCEDURE — 93880 EXTRACRANIAL BILAT STUDY: CPT | Performed by: FAMILY MEDICINE

## 2024-02-22 ENCOUNTER — TELEPHONE (OUTPATIENT)
Dept: GASTROENTEROLOGY | Facility: CLINIC | Age: 54
End: 2024-02-22

## 2024-02-22 NOTE — TELEPHONE ENCOUNTER
I mailed out colonoscopy/EGD results letter to patient.  Updated health maintenance  Entered into 10 yr CLN recall  Recall colon in 10 years per. Colon done 2/07/2024  EGD done 2/07/2024-No EGD recall entered    BIN See MD  [facility] Clinical Staff  GI staff: please place recall in for colonoscopy in 10 years

## 2024-03-18 RX ORDER — MONTELUKAST SODIUM 10 MG/1
10 TABLET ORAL NIGHTLY
Qty: 90 TABLET | Refills: 3 | Status: SHIPPED | OUTPATIENT
Start: 2024-03-18

## 2024-05-13 ENCOUNTER — TELEPHONE (OUTPATIENT)
Dept: HEMATOLOGY/ONCOLOGY | Facility: HOSPITAL | Age: 54
End: 2024-05-13

## 2024-05-13 NOTE — TELEPHONE ENCOUNTER
----- Message from Jes KRUSE sent at 5/13/2024 10:39 AM CDT -----  Regarding: r/s  May already be on your radar but since I'm covering I wanted to make sure.  Azul will not be here this Friday so she needs rescheduled please

## 2024-05-17 ENCOUNTER — APPOINTMENT (OUTPATIENT)
Dept: HEMATOLOGY/ONCOLOGY | Facility: HOSPITAL | Age: 54
End: 2024-05-17
Attending: FAMILY MEDICINE
Payer: COMMERCIAL

## 2024-05-22 ENCOUNTER — LAB ENCOUNTER (OUTPATIENT)
Dept: LAB | Age: 54
End: 2024-05-22
Attending: INTERNAL MEDICINE

## 2024-05-22 ENCOUNTER — HOSPITAL ENCOUNTER (OUTPATIENT)
Dept: ULTRASOUND IMAGING | Age: 54
Discharge: HOME | End: 2024-05-22
Attending: INTERNAL MEDICINE

## 2024-05-22 DIAGNOSIS — R16.1 SPLENOMEGALY: ICD-10-CM

## 2024-05-22 LAB
BASOPHILS # BLD AUTO: 0.03 X10(3) UL (ref 0–0.2)
BASOPHILS NFR BLD AUTO: 0.4 %
DEPRECATED RDW RBC AUTO: 39.3 FL (ref 35.1–46.3)
EOSINOPHIL # BLD AUTO: 0.24 X10(3) UL (ref 0–0.7)
EOSINOPHIL NFR BLD AUTO: 3.3 %
ERYTHROCYTE [DISTWIDTH] IN BLOOD BY AUTOMATED COUNT: 12.4 % (ref 11–15)
HCT VFR BLD AUTO: 37 %
HGB BLD-MCNC: 13.2 G/DL
IMM GRANULOCYTES # BLD AUTO: 0.02 X10(3) UL (ref 0–1)
IMM GRANULOCYTES NFR BLD: 0.3 %
LYMPHOCYTES # BLD AUTO: 2.45 X10(3) UL (ref 1–4)
LYMPHOCYTES NFR BLD AUTO: 33.2 %
MCH RBC QN AUTO: 31.1 PG (ref 26–34)
MCHC RBC AUTO-ENTMCNC: 35.7 G/DL (ref 31–37)
MCV RBC AUTO: 87.1 FL
MONOCYTES # BLD AUTO: 0.42 X10(3) UL (ref 0.1–1)
MONOCYTES NFR BLD AUTO: 5.7 %
NEUTROPHILS # BLD AUTO: 4.22 X10 (3) UL (ref 1.5–7.7)
NEUTROPHILS # BLD AUTO: 4.22 X10(3) UL (ref 1.5–7.7)
NEUTROPHILS NFR BLD AUTO: 57.1 %
PLATELET # BLD AUTO: 152 10(3)UL (ref 150–450)
RBC # BLD AUTO: 4.25 X10(6)UL
WBC # BLD AUTO: 7.4 X10(3) UL (ref 4–11)

## 2024-05-22 PROCEDURE — 85025 COMPLETE CBC W/AUTO DIFF WBC: CPT

## 2024-05-22 PROCEDURE — 76705 ECHO EXAM OF ABDOMEN: CPT | Performed by: INTERNAL MEDICINE

## 2024-05-22 PROCEDURE — 36415 COLL VENOUS BLD VENIPUNCTURE: CPT

## 2024-06-04 ENCOUNTER — OFFICE VISIT (OUTPATIENT)
Dept: HEMATOLOGY/ONCOLOGY | Facility: HOSPITAL | Age: 54
End: 2024-06-04
Attending: INTERNAL MEDICINE
Payer: COMMERCIAL

## 2024-06-04 VITALS
OXYGEN SATURATION: 99 % | BODY MASS INDEX: 33.06 KG/M2 | HEIGHT: 62 IN | TEMPERATURE: 98 F | HEART RATE: 86 BPM | DIASTOLIC BLOOD PRESSURE: 78 MMHG | WEIGHT: 179.63 LBS | SYSTOLIC BLOOD PRESSURE: 123 MMHG | RESPIRATION RATE: 18 BRPM

## 2024-06-04 DIAGNOSIS — R10.11 RIGHT UPPER QUADRANT ABDOMINAL PAIN: ICD-10-CM

## 2024-06-04 DIAGNOSIS — R16.1 SPLENOMEGALY: Primary | ICD-10-CM

## 2024-06-04 PROCEDURE — 99214 OFFICE O/P EST MOD 30 MIN: CPT | Performed by: INTERNAL MEDICINE

## 2024-06-04 NOTE — PROGRESS NOTES
Hematology Oncology Progress Note    Patient Name: Shawnee Ramirez   YOB: 1970   Medical Record Number: [id number]   CSN: [id number]   Attending Physician: Veda Liang MD    Date of Visit: 06/04/24     Chief Complaint: follow up on splenomegaly    Hematologic History:  Shawnee Ramirezis a 53 year old White female with history of HLD, anxiety/depression, COVID 1/2021 referred for evaluation of splenomegaly.   Patient reports chronic intermittent right upper quadrant pain and nausea for the last few years of unclear etiology. pain has been more consistent in the last few months. Lately radiating to the left upper quadrant with pressure and heaviness. Also complains of decreased appetite, early satiety, and bloating. Irregular bowel movements, with more diarrhea. No weight loss. Seen by GI and plan for EGD/colonoscopy in Feb 2024. She is going through menopause with occasional night sweats and hot flashes. Drinks alcohol rarely, smokes 1/2 pack a day for the last 10 yrs.     CT A/P w/contrast 9/19/23 with no acute abnormality to explain her symptoms, but showed mild splenomegaly 12.8 cm. Liver is normal. No abdominal or retroperitoneal lymphadenopathy. Ultrasound confirmed splenomegaly 13.8 cm. no masses or lesions.   Prior imaging reviewed; CT A/P 4/2018 with borderline spleen, and mild hepatic steatosis. ultrasound 7/2020 spleen measured 12.6 cm with normal echotexture.      No history of autoimmune, inflammatory or allergic disorders. No chronic or frequent infections. No family history of blood disorders. No history of blood clots.   Mother has autoimmune necrotizing myopathy, and father has hepatitis cirrhosis. Maternal grandfather had gastric ca.      Interval History:   Here for follow up and review of results.   Ultrasound showed stable mild splenomegaly.   Doing well. No new issues since last visit.   Still with occasional abdominal pain and bloating. Skin rash on and off.   EGD and  colonoscopy 2/2024 were unremrkable.     Current Medications:    Current Outpatient Medications:     montelukast 10 MG Oral Tab, Take 1 tablet (10 mg total) by mouth nightly., Disp: 90 tablet, Rfl: 3    Multiple Vitamin (MULTI-VITAMIN) Oral Tab, multivitamin tablet, [RxNorm: 0], Disp: , Rfl:     ZOLOFT 100 MG Oral Tab, Take 1 tablet (100 mg total) by mouth daily., Disp: 90 tablet, Rfl: 3    Review of Systems:  A comprehensive 10-point ROS completed all negative unless otherwise documented in HPI.     Weight:  Wt Readings from Last 6 Encounters:   06/04/24 81.5 kg (179 lb 9.6 oz)   01/27/24 81.2 kg (179 lb)   11/16/23 83.4 kg (183 lb 12.8 oz)   11/02/23 84 kg (185 lb 3.2 oz)   10/17/23 81.6 kg (180 lb)   09/25/23 83 kg (183 lb)      Vital Signs:  /78 (BP Location: Left arm, Patient Position: Sitting, Cuff Size: large)   Pulse 86   Temp 98.1 °F (36.7 °C) (Oral)   Resp 18   Ht 1.575 m (5' 2\")   Wt 81.5 kg (179 lb 9.6 oz)   LMP 02/05/2018 (Exact Date)   SpO2 99%   BMI 32.85 kg/m²     Physical Examination:  General: Alert and oriented x 3, not in acute distress.  HEENT: Non-icteric sclera.  Chest: non labored breathing.   Extremities: No edema.   Neurological: Grossly intact.   Psych/Depression: Appropriate mood and affect.     Laboratory:    Lab Results   Component Value Date    WBC 7.4 05/22/2024    RBC 4.25 05/22/2024    HGB 13.2 05/22/2024    HCT 37.0 05/22/2024    MCV 87.1 05/22/2024    MCH 31.1 05/22/2024    MCHC 35.7 05/22/2024    RDW 12.4 05/22/2024    .0 05/22/2024    MPV 9.4 04/20/2018     Lab Results   Component Value Date     09/19/2023    K 3.7 09/19/2023     09/19/2023    CO2 27.0 09/19/2023    BUN 17 09/19/2023    CREATSERUM 0.74 09/19/2023     (H) 09/19/2023    CA 9.6 09/19/2023    ALKPHO 106 09/19/2023    ALT 27 09/19/2023    AST 18 09/19/2023    BILT 0.6 09/19/2023    ALB 4.60 11/02/2023    TP 7.2 11/02/2023       Impression & Plan    Splenomegaly   - mild chronic  splenomegaly since 2018, with no lesions or masses. No liver disease. No abdominal or retroperitoneal lymphadenopathy.  -  CBC/Diff is completely normal.  LFT normal. RF negative. HIV negative, Mono screen negative.   - had extensive work up including KENDRA screen, SPEP/IFX, JAK2, CALR, MPL mutations, hepatitis B and C, blood flow cytometry all negative. US Abd with doppler showed normal vasculature.   - repeat US showed stable mild splenomegaly (13.2 cm) with no other changes.   - plan to continue observation and repeat labs and US spleen in 1 year to ensure stability.  So far no evidence of primary myelo or lymphoproliferative disease.     Abdominal pain:  - presenting with worsening RUQ pain, bloating, early satiety, irregular BM, dirrhea of unclear etiology.  - seen by GI and underwent EGD/CLN in 2/2024 that were unremarkable.     Skin rash:  - recurrent erythematous itchy rash under the breasts and groin area most likely cutaneous candidiasis  - start nystatin powder to affected areas 3-4 times a day as needed    Return in about 1 year (around 6/4/2025).     Veda Liang MD   [facility]

## 2024-06-28 ENCOUNTER — OFFICE VISIT (OUTPATIENT)
Facility: CLINIC | Age: 54
End: 2024-06-28

## 2024-06-28 VITALS
OXYGEN SATURATION: 97 % | BODY MASS INDEX: 33.68 KG/M2 | SYSTOLIC BLOOD PRESSURE: 128 MMHG | HEIGHT: 62 IN | DIASTOLIC BLOOD PRESSURE: 82 MMHG | WEIGHT: 183 LBS | HEART RATE: 90 BPM

## 2024-06-28 DIAGNOSIS — F41.9 ANXIETY: ICD-10-CM

## 2024-06-28 DIAGNOSIS — M65.311 TRIGGER FINGER OF RIGHT THUMB: ICD-10-CM

## 2024-06-28 DIAGNOSIS — E78.2 MIXED HYPERLIPIDEMIA: ICD-10-CM

## 2024-06-28 DIAGNOSIS — Z00.00 ENCOUNTER FOR ROUTINE ADULT HEALTH EXAMINATION WITHOUT ABNORMAL FINDINGS: Primary | ICD-10-CM

## 2024-06-28 DIAGNOSIS — Z12.31 SCREENING MAMMOGRAM, ENCOUNTER FOR: ICD-10-CM

## 2024-06-28 PROBLEM — N91.1 AMENORRHEA, SECONDARY: Status: RESOLVED | Noted: 2017-08-23 | Resolved: 2024-06-28

## 2024-06-28 PROCEDURE — G0438 PPPS, INITIAL VISIT: HCPCS | Performed by: FAMILY MEDICINE

## 2024-06-28 PROCEDURE — 3074F SYST BP LT 130 MM HG: CPT | Performed by: FAMILY MEDICINE

## 2024-06-28 PROCEDURE — 3008F BODY MASS INDEX DOCD: CPT | Performed by: FAMILY MEDICINE

## 2024-06-28 PROCEDURE — 99396 PREV VISIT EST AGE 40-64: CPT | Performed by: FAMILY MEDICINE

## 2024-06-28 PROCEDURE — 3079F DIAST BP 80-89 MM HG: CPT | Performed by: FAMILY MEDICINE

## 2024-06-28 RX ORDER — SERTRALINE HCL 100 MG
100 TABLET ORAL DAILY
Qty: 90 TABLET | Refills: 3 | Status: SHIPPED | OUTPATIENT
Start: 2024-06-28

## 2024-06-28 NOTE — PROGRESS NOTES
HPI:   Shawnee Ramirez is a 53 year old female who presents for a complete physical exam.     Has not met with the lipid specialist yet, but will consider meeting with him in the future.   She is still smoking due to stress, and plans to quit once her mom passes away as she is currently under a lot of stress.   Has been taking Zoloft 50 mg daily, which is working pretty well, but still has more stress related to caring for her mother.     Last pap: 6/2023 and normal  Last mammogram: 7/2023 and negative    Menses: Post-menopausal    Previous colonoscopy:  2/2024 and told to repeat in 10 years   History of STD's: HPV  History of intimate partner violence: None  Family hx of breast, ovarian, cervical or colon CA: None  Diet and exercise: Only eats 1-2 meals a day, and will skip breakfast. Lunch is a salad or dinner leftovers. Eats a lot of avocado toast with tomato, and also eats a lot of fruit. Dinner is usually home cooked, which is a lot of salad, chicken, and rarely fried foods. Also tries to limit red meat. Does not snack much or eat a lot of junk food. Drinks a lot of water. Does walk and garden a lot.   Immunizations:  Tdap: 2015, Shingles: Declines, Covid: Declines    REVIEW OF SYSTEMS:   GENERAL: feels well otherwise   SKIN: denies any unusual skin lesions  EYES: no vision problems  BREAST: no lumps or masses, no nipple discharge   LUNGS: chronic shortness of breath  CARDIOVASCULAR: denies chest pain  GI: denies abdominal pain,  No constipation or diarrhea, no hematochezia  : denies dysuria, vaginal discharge or itching  NEURO: denies headaches  PSYCHE: denies depression and stable anxiety   MSK: right thumb trigger finger           Current Outpatient Medications   Medication Sig Dispense Refill    ZOLOFT 100 MG Oral Tab Take 1 tablet (100 mg total) by mouth daily. 90 tablet 3    montelukast 10 MG Oral Tab Take 1 tablet (10 mg total) by mouth nightly. 90 tablet 3    Multiple Vitamin (MULTI-VITAMIN) Oral  Tab multivitamin tablet, [RxNorm: 0]       Allergies   Allergen Reactions    Aspirin SWELLING      Past Medical History:    Abnormal Pap smear of cervix    Allergic rhinitis    Anxiety    Asthma (HCC)    Calculus of kidney    Depression    High cholesterol    Human papilloma virus infection    Hyperlipidemia    Kidney stone    PONV (postoperative nausea and vomiting)    Screen for colon cancer    repeat CLN in 10 years    Seasonal allergies    SOB (shortness of breath)      Past Surgical History:   Procedure Laterality Date    Breast prosthesis nos Bilateral 2000    saline    Breast reconstruction      breast aug 2012    Colonoscopy N/A 2/7/2024    Procedure: COLONOSCOPY;  Surgeon: BIN See MD;  Location: [facility] ENDO    Colposcopy, cervix w/upper adjacent vagina; w/biopsy(s), cervix      Leep      Lithotripsy  2016    Kidney stone     Other surgical history      COSMETIC SURGERY       Family History   Problem Relation Age of Onset    Other (Hepatitis C) Father     Stroke Mother     Diabetes Mother     Lipids Mother         Cholesterol    Heart Disorder Mother         Valve replacement     Other (Autoimmune necrotizing myopathy) Mother         Due to statin     Hypertension Mother     Anemia Mother     Stroke Maternal Grandmother     Cancer Maternal Grandfather         Stomach      Social History:   Social History     Socioeconomic History    Marital status:    Tobacco Use    Smoking status: Every Day     Current packs/day: 0.50     Average packs/day: 0.5 packs/day for 20.5 years (10.2 ttl pk-yrs)     Types: Cigarettes     Start date: 1990     Last attempt to quit: 2000    Smokeless tobacco: Never   Vaping Use    Vaping status: Never Used   Substance and Sexual Activity    Alcohol use: Not Currently     Comment: monthly at the most    Drug use: No    Sexual activity: Yes     Partners: Male   Other Topics Concern    Exercise No    Stress Concern Yes    Weight Concern Yes    Reaction to local anesthetic No     Pt has a pacemaker No    Pt has a defibrillator No     Social Determinants of Health      Received from [facility], [facility]    Social Connections    Received from [facility], [facility]    Housing Stability     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 : Yes. Children: None.         EXAM:     Wt Readings from Last 6 Encounters:   06/28/24 183 lb (83 kg)   06/04/24 179 lb 9.6 oz (81.5 kg)   01/27/24 179 lb (81.2 kg)   11/16/23 183 lb 12.8 oz (83.4 kg)   11/02/23 185 lb 3.2 oz (84 kg)   10/17/23 180 lb (81.6 kg)     Body mass index is 33.47 kg/m².   /82   Pulse 90   Ht 5' 2\" (1.575 m)   Wt 183 lb (83 kg)   LMP 02/05/2018 (Exact Date)   SpO2 97%   BMI 33.47 kg/m²     GENERAL: well developed, well nourished, in no apparent distress   SKIN: no rashes, no suspicious lesions  HEENT: atraumatic, normocephalic, throat clear; normal dentition  EYES: PERRLA, EOMI, conjunctiva are clear  NECK: supple, no adenopathy or thyroid masses   BREAST: no dominant or suspicious mass, no nipple discharge  LUNGS: clear to auscultation  CARDIO: RRR without murmur  GI: good bowel sounds, no masses, HSM or tenderness  : deferred, not due for pap smear and no concerns   EXTREMITIES: no edema    Cholesterol, Total (mg/dL)   Date Value   09/26/2023 304 (H)   07/29/2021 312 (H)   06/12/2020 299 (H)     HDL Cholesterol (mg/dL)   Date Value   09/26/2023 43   07/29/2021 36 (L)   06/12/2020 49     LDL Cholesterol (mg/dL)   Date Value   09/26/2023 209 (H)   07/29/2021 187 (H)   06/12/2020 191 (H)      ASSESSMENT AND PLAN:   Shawnee Ramirez is a 53 year old female who presents for a complete physical exam.  Encounter Diagnoses   Name Primary?    Encounter for routine adult health examination without abnormal findings Yes    Screening mammogram, encounter for     Mixed hyperlipidemia     Anxiety     Trigger finger of  right thumb      Orders Placed This Encounter   Procedures    Comp Metabolic Panel (14)    Hemoglobin A1C    Lipid Panel     Recommend seeing lipid specialist given chronically elevated cholesterol with mom's history of hyperlipidemia and autoimmune necrotizing myopathy due to statins. She will discuss alternative options to statins.  Anxiety stable on Zoloft 50 mg daily, and new script printed.  Referral to physiatrist given due to right trigger thumb.     Discussed with patient the following:  -Monthly breast self-exam  -Breast cancer screening/mammograms and clinical breast exams  -Cervical cancer screening/pap smears  -Colon cancer screening/colonoscopy  -Adequate calcium and Vitamin D intake to prevent osteoporosis  -Bone density screening for osteopenia/osteoporosis  -Healthy diet including adequate intake of vegetables and fruits, appropriate portion sizes, minimizing highly concentrated carbohydrate foods  -Exercising 30 minutes a day most days of the week   -Smoking cessation   -Diabetes screening which she desires  -Cholesterol screening which she desires  -Recommendation for yearly influenza vaccine  -Need for Tdap once as an adult and Td booster every 10 years  -Need for Zoster vaccine for patients >= 50  -STI screening (GC/Chlamydia/HIV): Not indicated  -Hepatitis C screening for all adults between the ages of 18 and 79: Checked and negative       All questions were answered during the visit and the patient verbalizes understanding. She will return in one year for next WWE or sooner as needed.    Meds & Refills for this Visit:  Requested Prescriptions     Signed Prescriptions Disp Refills    ZOLOFT 100 MG Oral Tab 90 tablet 3     Sig: Take 1 tablet (100 mg total) by mouth daily.       Imaging & Consults:  [facility] CARDIOLOGY EXTERNAL  PHYSIATRY - INTERNAL  [facility] MICHAEL 2D+3D SCREENING [facility] MATHEW([id number])    Jolly Jacinto DO  6/28/2024  11:13 AM

## 2024-08-15 ENCOUNTER — OFFICE VISIT (OUTPATIENT)
Dept: PHYSICAL MEDICINE AND REHAB | Facility: CLINIC | Age: 54
End: 2024-08-15
Payer: COMMERCIAL

## 2024-08-15 VITALS — WEIGHT: 180 LBS | BODY MASS INDEX: 33.13 KG/M2 | HEIGHT: 62 IN

## 2024-08-15 DIAGNOSIS — M65.311 TRIGGER FINGER OF RIGHT THUMB: Primary | ICD-10-CM

## 2024-08-15 PROCEDURE — 3008F BODY MASS INDEX DOCD: CPT | Performed by: PHYSICAL MEDICINE & REHABILITATION

## 2024-08-15 PROCEDURE — 99204 OFFICE O/P NEW MOD 45 MIN: CPT | Performed by: PHYSICAL MEDICINE & REHABILITATION

## 2024-08-19 ENCOUNTER — TELEPHONE (OUTPATIENT)
Dept: PHYSICAL MEDICINE AND REHAB | Facility: CLINIC | Age: 54
End: 2024-08-19

## 2024-08-20 NOTE — TELEPHONE ENCOUNTER
T calling back to see if her injections was approved, informed that her injection was approved and the  should be giving her a call to schedule the procedure.

## 2024-08-27 NOTE — PROGRESS NOTES
[facility]  NEW PATIENT EVALUATION    Consultation as a request of Dr. Jacinto      HISTORY OF PRESENT ILLNESS:     Chief Complaint   Patient presents with    Finger Pain     New right handed patient, referred by Dr. Jacinto, comes in with tightness and pain in R thumb. Denies T/N. Admits weakness. Rates pain 0/10 now, worsens when using her finger. Denies imaging. Denies PT. Symptoms began 1 month ago, denies injury. Admits L thumb trigger finger 10 years ago. Denies medication.       The patient is a 54 year old female with significant past medical history of anxiety, asthma, depression, hyperlipidemia, kidney stones, seasonal allergies who presents with right thumb pain.  Patient rates the pain to be 0 out of 10 now but with increased activity and usage the pain can be triggered.  Pain is located at the A1 pulley.  She states this has been going on for the last several weeks.  She denies any numbness tingling or weakness.  She had a left thumb trigger finger 10 years ago for which she had a corticosteroid injection proved greatly.  She has not any dedicated treatment for this problem.    PHYSICAL EXAM:   Ht 62\"   Wt 180 lb (81.6 kg)   LMP 02/05/2018 (Exact Date)   BMI 32.92 kg/m²       WRIST/HAND:  Inspection: no erythema, swelling, or obvious deformity  Palpation: Tenderness to palpation of the A1 pulley of the right thumb  ROM: intact to all planes of motion  Strength: 5/5   Sensation: Intact to light touch in all dermatomes of the lower extremities  Reflexes: 2/4 at C5, C6, C7  Phalens Test: negative for reproducible symptoms  Reverse Phalens: negative for reproducible symptoms  Tinel's test: negative for reproducible symptoms    IMAGING:     None    All imaging results were reviewed and discussed with patient.      ASSESSMENT/PLAN:     1. Trigger finger of right thumb        Shawnee Ramirez is a 54-year-old female presenting today for evaluation of right thumb  pain secondary to trigger finger.  She was recommended ultrasound-guided trigger thumb injection which advised to follow-up once that is approved.  Recommend she start occupational therapy and bracing in the meantime with topical Voltaren gel as tolerated.      The patient verbalized understanding with the plan and was in agreement. All questions/concerns were addressed and there were no barriers to learning.  Please note Dragon dictation software was used to dictate this note and may result in inadvertent typos.    Panfilo Castañeda DO, FAAPMR & CAQSM  Physical Medicine and Rehabilitation  Sports and Spine Medicine    PAST MEDICAL HISTORY:     Past Medical History:    Abnormal Pap smear of cervix    Allergic rhinitis    Anxiety    Asthma (HCC)    Calculus of kidney    Depression    High cholesterol    Human papilloma virus infection    Hyperlipidemia    Kidney stone    PONV (postoperative nausea and vomiting)    Screen for colon cancer    repeat CLN in 10 years    Seasonal allergies    SOB (shortness of breath)         PAST SURGICAL HISTORY:     Past Surgical History:   Procedure Laterality Date    Breast prosthesis nos Bilateral 2000    saline    Breast reconstruction      breast aug 2012    Colonoscopy N/A 2/7/2024    Procedure: COLONOSCOPY;  Surgeon: BIN See MD;  Location: [facility] ENDO    Colposcopy, cervix w/upper adjacent vagina; w/biopsy(s), cervix      Leep      Lithotripsy  2016    Kidney stone     Other surgical history      COSMETIC SURGERY          CURRENT MEDICATIONS:     Current Outpatient Medications   Medication Sig Dispense Refill    ZOLOFT 100 MG Oral Tab Take 1 tablet (100 mg total) by mouth daily. 90 tablet 3    montelukast 10 MG Oral Tab Take 1 tablet (10 mg total) by mouth nightly. 90 tablet 3    Multiple Vitamin (MULTI-VITAMIN) Oral Tab multivitamin tablet, [RxNorm: 0]           ALLERGIES:     Allergies   Allergen Reactions    Aspirin SWELLING         FAMILY HISTORY:     Family History    Problem Relation Age of Onset    Other (Hepatitis C) Father     Stroke Mother     Diabetes Mother     Lipids Mother         Cholesterol    Heart Disorder Mother         Valve replacement     Other (Autoimmune necrotizing myopathy) Mother         Due to statin     Hypertension Mother     Anemia Mother     Stroke Maternal Grandmother     Cancer Maternal Grandfather         Stomach          SOCIAL HISTORY:     Social History     Socioeconomic History    Marital status:    Tobacco Use    Smoking status: Every Day     Current packs/day: 0.50     Average packs/day: 0.5 packs/day for 20.7 years (10.3 ttl pk-yrs)     Types: Cigarettes     Start date: 1990     Last attempt to quit: 2000    Smokeless tobacco: Never   Vaping Use    Vaping status: Never Used   Substance and Sexual Activity    Alcohol use: Not Currently     Comment: monthly at the most    Drug use: No    Sexual activity: Yes     Partners: Male   Other Topics Concern    Exercise No    Stress Concern Yes    Weight Concern Yes    Reaction to local anesthetic No    Pt has a pacemaker No    Pt has a defibrillator No     Social Determinants of Health      Received from [facility], [facility]    Social Connections    Received from [facility], [facility]    Housing Stability          REVIEW OF SYSTEMS:   No patient-reported data collected this visit.      PHYSICAL EXAM:   General: No immediate distress  Head: Normocephalic/ Atraumatic  Eyes: Extra-occular movements intact.   Ears: No auricular hematoma or deformities  Mouth: No lesions or ulcerations  Heart: peripheral pulses intact. Normal capillary refill.   Lungs: Non-labored respirations  Abdomen: No abdominal guarding  Extremities: No lower extremity edema bilaterally   Skin: No lesions noted   Cognition: alert & oriented x 3, attentive, able to follow 2 step commands, comprehention intact, spontaneous speech  intact  Psychiatric: Mood and affect appropriate      LABS:     Lab Results   Component Value Date     09/26/2023    A1C 5.3 09/26/2023     Lab Results   Component Value Date    WBC 7.4 05/22/2024    RBC 4.25 05/22/2024    HGB 13.2 05/22/2024    HCT 37.0 05/22/2024    MCV 87.1 05/22/2024    MCH 31.1 05/22/2024    MCHC 35.7 05/22/2024    RDW 12.4 05/22/2024    .0 05/22/2024    MPV 9.4 04/20/2018     Lab Results   Component Value Date     (H) 09/19/2023    BUN 17 09/19/2023    BUNCREA 23.0 (H) 09/19/2023    CREATSERUM 0.74 09/19/2023    ANIONGAP 4 09/19/2023    GFRNAA 85 02/08/2022    GFRAA 98 02/08/2022    CA 9.6 09/19/2023    OSMOCALC 295 09/19/2023    ALKPHO 106 09/19/2023    AST 18 09/19/2023    ALT 27 09/19/2023    BILT 0.6 09/19/2023    TP 7.2 11/02/2023    ALB 4.60 11/02/2023    GLOBULIN 3.7 09/19/2023     09/19/2023    K 3.7 09/19/2023     09/19/2023    CO2 27.0 09/19/2023     No results found for: \"PTP\", \"PT\", \"INR\"  Lab Results   Component Value Date    VITD 34.5 07/21/2020

## 2024-08-29 ENCOUNTER — OFFICE VISIT (OUTPATIENT)
Dept: PHYSICAL MEDICINE AND REHAB | Facility: CLINIC | Age: 54
End: 2024-08-29
Payer: COMMERCIAL

## 2024-08-29 DIAGNOSIS — M65.311 TRIGGER FINGER OF RIGHT THUMB: Primary | ICD-10-CM

## 2024-08-31 NOTE — PROCEDURES
Procedure:  Ultrasound guided RIGHT thumb trigger finger aspiration and injection with corticosteroid  The risks, benefits and anticipated outcomes of the procedure, the risks and benefits of the alternatives to the procedure, and the roles and tasks of the personnel to be involved, were discussed with the patient, and the patient consents to the procedure and agrees to proceed.  UNIVERSAL PROTOCOL / SAFETY CHECKLIST  Sign in Communication: Completed  Time Out:  Team Confirms the Correct Patient, Correct Procedure, Correct Site and Site Marking, Correct Position (if applicable), Prep and Dry Time (if applicable).      The procedure was carried out under sterile prep with  with sterile gel.  Ultrasound evaluation of the area was performed prior to injection to identify the flexor tendon and the A1 pulley.  Topical spray with ethyl chloride was applied in the area for skin anesthesia.  Following this, under ultrasound guidance with the transducer in transverse orientation and using a out of plane injection technique, a 25 ga 1.5in needle was introduced and advanced until the needle tip was visualized in the flexor tendon sheath near the A1 pulley.  Following negative aspiration, a mixture of 0.5 cc of lidocaine and 0.5 cc of Kenalog (40mg/ml) was injected.  Permanent images were taken and stored in the PACS system.       Ultrasound interpretation was performed prior to the procedure to identify the target for injection and any adjacent neurovascular structures.  Subsequently, interpretation was performed during real-time needle guidance confirming placement of the needle at the target.  Post-intervention interpretation was also performed confirming appropriate injectate flow and hemostasis. The patient tolerated the procedure without complication and was instructed in post-procedure precautions.  See patient instructions.     Panfilo Castañeda DO, FAAPMR & CAQSM  Physical Medicine and Rehabilitation/Sports  [facility] [place]\

## 2024-09-26 ENCOUNTER — TELEMEDICINE (OUTPATIENT)
Dept: PHYSICAL MEDICINE AND REHAB | Facility: CLINIC | Age: 54
End: 2024-09-26
Payer: COMMERCIAL

## 2024-09-26 DIAGNOSIS — M65.311 TRIGGER FINGER OF RIGHT THUMB: Primary | ICD-10-CM

## 2024-09-26 PROCEDURE — 99213 OFFICE O/P EST LOW 20 MIN: CPT | Performed by: PHYSICAL MEDICINE & REHABILITATION

## 2024-09-28 NOTE — PROGRESS NOTES
[facility]    Telemedicine Visit - Follow Up Evaluation    Telehealth Verbal Consent   I conducted a telehealth visit with Shawnee Ramirez today, 09/27/24, which was completed using two-way, real-time interactive audio and video communication. This has been done in good eduin to provide continuity of care in the best interest of the provider-patient relationship, due to the COVID - public health crisis/national emergency where restrictions of face-to-face office visits are ongoing. Every conscious effort was taken to allow for sufficient and adequate time to complete the visit.  The patient was made aware of the limitations of the telehealth visit, including treatment limitations as no physical exam could be performed.  The patient was advised to call 911 or to go to the ER in case there was an emergency.  The patient was also advised of the potential privacy & security concerns related to the telehealth platform.   The patient was made aware of where to find [facility]'s notice of privacy practices, telehealth consent form and other related consent forms and documents.  which are located on the [facility] website. The patient verbally agreed to telehealth consent form, related consents and the risks discussed.    Lastly, the patient confirmed that they were in [place].   Included in this visit, time may have been spent reviewing labs, medications, radiology tests and decision making. Appropriate medical decision-making and tests are ordered as detailed in the plan of care above.  Coding/billing information is submitted for this visit based on complexity of care and/or time spent for the visit.      HISTORY OF PRESENT ILLNESS:     Patient is following up right thumb trigger finger.  She has responded well to the ultrasound-guided trigger finger injection.  She denies any locking or pain at this point.  She has full range of motion.  She is not doing any home exercises at  this time or any topical treatment.      IMAGING:   No new imaging    All imaging results were reviewed and discussed with patient.      ASSESSMENT:     1. Trigger finger of right thumb          PLAN:   Shawnee Ramirez is a 54 year old female following up for right thumb pain secondary to trigger finger.  She has responded well to the ultrasound-guided corticosteroid injection.  Recommend that she continue home exercise program topical treatment and follow-up with me as needed in the future.      Follow-up:   PRN    We discussed that a telemedicine visit is in place of an office visit; however, this limits the ability to perform a thorough physical examination which may affect objective findings related to a specific condition and can affect treatment.    The patient verbalized understanding with this plan and was in agreement.  There are no barriers to learning.  All questions were answered.  Please note Dragon dictation software was used to dictate this note which may result in inadvertent typos.    Panfilo Castañeda D.O. FAAPMR & CAQSM  Physical Medicine and Rehabilitation/Sports Medicine    PAST MEDICAL HISTORY:     Past Medical History:    Abnormal Pap smear of cervix    Allergic rhinitis    Anxiety    Asthma (HCC)    Calculus of kidney    Depression    High cholesterol    Human papilloma virus infection    Hyperlipidemia    Kidney stone    PONV (postoperative nausea and vomiting)    Screen for colon cancer    repeat CLN in 10 years    Seasonal allergies    SOB (shortness of breath)         PAST SURGICAL HISTORY:     Past Surgical History:   Procedure Laterality Date    Breast prosthesis nos Bilateral 2000    saline    Breast reconstruction      breast aug 2012    Colonoscopy N/A 2/7/2024    Procedure: COLONOSCOPY;  Surgeon: BIN See MD;  Location: [facility] ENDO    Colposcopy, cervix w/upper adjacent vagina; w/biopsy(s), cervix      Leep      Lithotripsy  2016    Kidney stone     Other surgical history       COSMETIC SURGERY          CURRENT MEDICATIONS:     Current Outpatient Medications   Medication Sig Dispense Refill    ZOLOFT 100 MG Oral Tab Take 1 tablet (100 mg total) by mouth daily. 90 tablet 3    montelukast 10 MG Oral Tab Take 1 tablet (10 mg total) by mouth nightly. 90 tablet 3    Multiple Vitamin (MULTI-VITAMIN) Oral Tab multivitamin tablet, [RxNorm: 0]           ALLERGIES:     Allergies   Allergen Reactions    Aspirin SWELLING         FAMILY HISTORY:     Family History   Problem Relation Age of Onset    Other (Hepatitis C) Father     Stroke Mother     Diabetes Mother     Lipids Mother         Cholesterol    Heart Disorder Mother         Valve replacement     Other (Autoimmune necrotizing myopathy) Mother         Due to statin     Hypertension Mother     Anemia Mother     Stroke Maternal Grandmother     Cancer Maternal Grandfather         Stomach          SOCIAL HISTORY:     Social History     Socioeconomic History    Marital status:    Tobacco Use    Smoking status: Every Day     Current packs/day: 0.50     Average packs/day: 0.5 packs/day for 20.7 years (10.4 ttl pk-yrs)     Types: Cigarettes     Start date: 1990     Last attempt to quit: 2000    Smokeless tobacco: Never   Vaping Use    Vaping status: Never Used   Substance and Sexual Activity    Alcohol use: Not Currently     Comment: monthly at the most    Drug use: No    Sexual activity: Yes     Partners: Male   Other Topics Concern    Exercise No    Stress Concern Yes    Weight Concern Yes    Reaction to local anesthetic No    Pt has a pacemaker No    Pt has a defibrillator No     Social Determinants of Health      [facility]    Social Connections    [facility] from [facility]    Housing Stability          REVIEW OF SYSTEMS:   As noted in HPI      PHYSICAL EXAM:   General: No immediate distress  Head: Normocephalic/  Atraumatic  Eyes: Extra-occular movements intact  Ears/Nose/Throat:  External appearance identifies normal appearance without obvious deformity  Cardiovascular: No cyanosis, clubbing or edema  Respiratory: Non-labored respirations  Skin: No lesions noted   Neurological: alert & oriented x 3, attentive, able to follow commands, comprehention intact, spontaneous speech intact  Psychiatric: Mood and affect appropriate  Musculoskeletal Exam:  No change since last exam        LABS:     Lab Results   Component Value Date     09/26/2023    A1C 5.3 09/26/2023     Lab Results   Component Value Date    WBC 7.4 05/22/2024    RBC 4.25 05/22/2024    HGB 13.2 05/22/2024    HCT 37.0 05/22/2024    MCV 87.1 05/22/2024    MCH 31.1 05/22/2024    MCHC 35.7 05/22/2024    RDW 12.4 05/22/2024    .0 05/22/2024    MPV 9.4 04/20/2018     Lab Results   Component Value Date     (H) 09/19/2023    BUN 17 09/19/2023    BUNCREA 23.0 (H) 09/19/2023    CREATSERUM 0.74 09/19/2023    ANIONGAP 4 09/19/2023    GFRNAA 85 02/08/2022    GFRAA 98 02/08/2022    CA 9.6 09/19/2023    OSMOCALC 295 09/19/2023    ALKPHO 106 09/19/2023    AST 18 09/19/2023    ALT 27 09/19/2023    BILT 0.6 09/19/2023    TP 7.2 11/02/2023    ALB 4.60 11/02/2023    GLOBULIN 3.7 09/19/2023     09/19/2023    K 3.7 09/19/2023     09/19/2023    CO2 27.0 09/19/2023     No results found for: \"PTP\", \"PT\", \"INR\"  Lab Results   Component Value Date    VITD 34.5 07/21/2020

## 2025-01-23 ENCOUNTER — APPOINTMENT (OUTPATIENT)
Dept: GENERAL RADIOLOGY | Age: 55
End: 2025-01-23
Attending: NURSE PRACTITIONER
Payer: COMMERCIAL

## 2025-01-23 ENCOUNTER — HOSPITAL ENCOUNTER (OUTPATIENT)
Age: 55
Discharge: HOME | End: 2025-01-23
Payer: COMMERCIAL

## 2025-01-23 VITALS
DIASTOLIC BLOOD PRESSURE: 90 MMHG | SYSTOLIC BLOOD PRESSURE: 143 MMHG | TEMPERATURE: 99 F | HEART RATE: 99 BPM | RESPIRATION RATE: 20 BRPM | OXYGEN SATURATION: 99 %

## 2025-01-23 DIAGNOSIS — R21 RASH: ICD-10-CM

## 2025-01-23 DIAGNOSIS — R05.1 ACUTE COUGH: Primary | ICD-10-CM

## 2025-01-23 PROCEDURE — 71046 X-RAY EXAM CHEST 2 VIEWS: CPT | Performed by: NURSE PRACTITIONER

## 2025-01-23 PROCEDURE — 99213 OFFICE O/P EST LOW 20 MIN: CPT | Performed by: NURSE PRACTITIONER

## 2025-01-23 RX ORDER — PREDNISONE 20 MG/1
40 TABLET ORAL DAILY
Qty: 10 TABLET | Refills: 0 | Status: SHIPPED | OUTPATIENT
Start: 2025-01-23 | End: 2025-01-28

## 2025-01-23 RX ORDER — BENZOCAINE/MENTHOL 6 MG-10 MG
1 LOZENGE MUCOUS MEMBRANE 2 TIMES DAILY
Qty: 30 G | Refills: 0 | Status: SHIPPED | OUTPATIENT
Start: 2025-01-23

## 2025-01-23 RX ORDER — BENZONATATE 200 MG/1
200 CAPSULE ORAL 3 TIMES DAILY PRN
Qty: 30 CAPSULE | Refills: 0 | Status: SHIPPED | OUTPATIENT
Start: 2025-01-23

## 2025-01-23 NOTE — ED PROVIDER NOTES
Patient Seen in: [facility]      History     Chief Complaint   Patient presents with    Cough     Getting over flu and have a deep crunchy wet cough, dizziness, headache sweating and nausea. - Entered by patient     Stated Complaint: Cough - Getting over flu and have a deep crunchy wet cough, dizziness, headache*    Subjective:   55 y/o female with medical conditions as noted below presents with c/o harsh, wet cough, nausea, headache and intermittent lightheadedness 1 week ago Wednesday. Developed an itchy rash to chest a few days later. Had positive home influenza test. Has been taking acetaminophen and using Neosporin to the rash.  No cold meds taken.  No fever/chills, chest pain, shortness of breath, room spinning sensation, abdominal pain, vomiting/diarrhea.  No nausea or lightheadedness at this time.  No change in soap, detergent, lotion, food              Objective:     Past Medical History:    Abnormal Pap smear of cervix    Allergic rhinitis    Anxiety    Asthma (HCC)    Calculus of kidney    Depression    High cholesterol    Human papilloma virus infection    Hyperlipidemia    Kidney stone    PONV (postoperative nausea and vomiting)    Screen for colon cancer    repeat CLN in 10 years    Seasonal allergies    SOB (shortness of breath)              Past Surgical History:   Procedure Laterality Date    Breast prosthesis nos Bilateral 2000    saline    Breast reconstruction      breast aug 2012    Colonoscopy N/A 2/7/2024    Procedure: COLONOSCOPY;  Surgeon: BIN See MD;  Location: [facility] ENDO    Colposcopy, cervix w/upper adjacent vagina; w/biopsy(s), cervix      Leep      Lithotripsy  2016    Kidney stone     Other surgical history      COSMETIC SURGERY                 Social History     Socioeconomic History    Marital status:    Tobacco Use    Smoking status: Every Day     Current packs/day: 0.50     Average packs/day: 0.5 packs/day for 21.1 years (10.5 ttl pk-yrs)     Types:  Cigarettes     Start date: 1990     Last attempt to quit: 2000    Smokeless tobacco: Never   Vaping Use    Vaping status: Never Used   Substance and Sexual Activity    Alcohol use: Not Currently     Comment: monthly at the most    Drug use: No    Sexual activity: Yes     Partners: Male   Other Topics Concern    Exercise No    Stress Concern Yes    Weight Concern Yes    Reaction to local anesthetic No    Pt has a pacemaker No    Pt has a defibrillator No     Social Drivers of Health      [facility]    Social Connections    Received from [facility], [facility]    Housing Stability              Review of Systems   Constitutional:  Negative for chills and fever.   HENT:  Negative for rhinorrhea and sore throat.    Respiratory:  Positive for cough. Negative for shortness of breath.    Gastrointestinal:  Positive for nausea. Negative for abdominal pain, diarrhea and vomiting.   Neurological:  Positive for light-headedness and headaches. Negative for dizziness.   All other systems reviewed and are negative.      Positive for stated complaint: Cough - Getting over flu and have a deep crunchy wet cough, dizziness, headache*  Other systems are as noted in HPI.  Constitutional and vital signs reviewed.      All other systems reviewed and negative except as noted above.    Physical Exam     ED Triage Vitals [01/23/25 1334]   /90   Pulse 99   Resp 20   Temp 98.9 °F (37.2 °C)   Temp src Oral   SpO2 99 %   O2 Device None (Room air)       Current Vitals:   Vital Signs  BP: 143/90  Pulse: 99  Resp: 20  Temp: 98.9 °F (37.2 °C)  Temp src: Oral    Oxygen Therapy  SpO2: 99 %  O2 Device: None (Room air)        Physical Exam  Vitals and nursing note reviewed.   Constitutional:       General: She is not in acute distress.     Appearance: Normal appearance. She is not ill-appearing.   HENT:      Head: Normocephalic.      Right  Ear: Tympanic membrane and external ear normal.      Left Ear: Tympanic membrane and external ear normal.      Nose: Nose normal.      Mouth/Throat:      Mouth: Mucous membranes are moist.      Pharynx: Oropharynx is clear. Uvula midline.      Tonsils: No tonsillar exudate.   Cardiovascular:      Rate and Rhythm: Normal rate and regular rhythm.   Pulmonary:      Effort: Pulmonary effort is normal.      Breath sounds: Normal breath sounds.   Musculoskeletal:         General: Normal range of motion.      Cervical back: Normal range of motion and neck supple.   Skin:     General: Skin is warm.      Capillary Refill: Capillary refill takes less than 2 seconds.      Comments: Red, rash to chest with excoriation   Neurological:      General: No focal deficit present.      Mental Status: She is alert and oriented to person, place, and time.      GCS: GCS eye subscore is 4. GCS verbal subscore is 5. GCS motor subscore is 6.   Psychiatric:         Behavior: Behavior is cooperative.             ED Course   Labs Reviewed - No data to display  XR CHEST PA + LAT CHEST (CPT=71046)   Final Result   PROCEDURE: XR CHEST PA + LAT CHEST (CPT=71046)       COMPARISON: [facility] in [place], XR CHEST PA + LAT    CHEST (CPT=71046), 3/25/2022, 2:22 PM.       INDICATIONS: Cough, rash on chest and dizziness for 1 week.       TECHNIQUE:   Two views.         FINDINGS:    CARDIAC/VASC: Heart size and pulmonary vascularity normal.    MEDIAST/ROBERT:   No visible mass or adenopathy.   LUNGS/PLEURA: No consolidation or pleural effusion.   BONES: No fracture or visible bony lesion.  Degenerative changes in spine.   OTHER: Negative.                     =====   CONCLUSION:    1. No acute cardiopulmonary finding.               Dictated by (CST): Abran Thao MD on 1/23/2025 at 2:16 PM        Finalized by (CST): Abran Thao MD on 1/23/2025 at 2:16 PM                               [facility]              Medical Decision Making  Patient is  well-appearing.  Respirations easy nonlabored.  Positive congested cough.  Neurologically intact  I discussed differentials with patient including but not limited to viral uri vs pneumonia  Chest x-ray independently reviewed and discussed with patient.  Negative for pneumonia  Push fluids, cool mist humidifier, good hand washing.  Avoid scratching rash  Discussed with patient that her cough is viral in nature.  No antibiotics warranted at this time  Rx prednisone, benzonatate, hydrocortisone cream  otc meds prn  Fu with PCP. Return/ ED precautions discussed      Problems Addressed:  Acute cough: acute illness or injury  Rash: acute illness or injury    Amount and/or Complexity of Data Reviewed  Labs: ordered. Decision-making details documented in ED Course.  Radiology: ordered. Decision-making details documented in ED Course.    Risk  OTC drugs.  Prescription drug management.        Disposition and Plan     Clinical Impression:  1. Acute cough    2. Rash         Disposition:  Discharge  1/23/2025  2:35 pm    Follow-up:  Jolly Jacinto DO  [address] [place] [address]  [phone]    Schedule an appointment as soon as possible for a visit             Medications Prescribed:  Discharge Medication List as of 1/23/2025  2:39 PM        START taking these medications    Details   predniSONE 20 MG Oral Tab Take 2 tablets (40 mg total) by mouth daily for 5 days., Normal, Disp-10 tablet, R-0      benzonatate 200 MG Oral Cap Take 1 capsule (200 mg total) by mouth 3 (three) times daily as needed for cough., Normal, Disp-30 capsule, R-0      hydrocortisone 1 % External Cream Apply 1 Application topically 2 (two) times daily., Normal, Disp-30 g, R-0                 Supplementary Documentation:

## 2025-01-23 NOTE — ED INITIAL ASSESSMENT (HPI)
Pt here with complaints of cough rash on chest and dizziness for 1 week , pt states cough has not improved pt denies any fevers or sob

## 2025-02-06 ENCOUNTER — TELEPHONE (OUTPATIENT)
Facility: CLINIC | Age: 55
End: 2025-02-06

## 2025-02-06 DIAGNOSIS — E78.2 MIXED HYPERLIPIDEMIA: Primary | ICD-10-CM

## 2025-02-06 DIAGNOSIS — R00.2 PALPITATIONS: ICD-10-CM

## 2025-02-06 NOTE — TELEPHONE ENCOUNTER
Dr Jacinto - see pended [facility] Referral - new insurance card.       Managed Care --- please assist         Received call from patient Patient's date of birth and full name both confirmed.   Received a new insurance card in the mail   And needs referral to follow-up with cardiologist - [facility].     Rn transferred the call to Patient  to update insurance plan.   And RN will pend [facility] referral for provider to sign.

## 2025-03-12 RX ORDER — MONTELUKAST SODIUM 10 MG/1
10 TABLET ORAL NIGHTLY
Qty: 90 TABLET | Refills: 1 | Status: SHIPPED | OUTPATIENT
Start: 2025-03-12

## 2025-03-12 RX ORDER — MONTELUKAST SODIUM 10 MG/1
10 TABLET ORAL NIGHTLY
Qty: 90 TABLET | Refills: 3 | OUTPATIENT
Start: 2025-03-12

## 2025-03-12 NOTE — TELEPHONE ENCOUNTER
Please review.  Protocol failed/has no protocol.    Last Office Visit: 06/28/2024  Please advise if refill is appropriate.  Requested Prescriptions     Pending Prescriptions Disp Refills    MONTELUKAST 10 MG Oral Tab [Pharmacy Med Name: MONTELUKAST 10MG TABLETS] 90 tablet 3     Sig: TAKE 1 TABLET(10 MG) BY MOUTH EVERY NIGHT     No future appointments.

## 2025-06-18 ENCOUNTER — NURSE TRIAGE (OUTPATIENT)
Facility: CLINIC | Age: 55
End: 2025-06-18

## 2025-06-18 NOTE — TELEPHONE ENCOUNTER
Action Requested: Summary for Provider     []  Critical Lab, Recommendations Needed  [] Need Additional Advice  [x]   FYI    []   Need Orders  [] Need Medications Sent to Pharmacy  []  Other     SUMMARY:   Spoke with patient, Date of Birth verified  She is complaining about rash underneath her breast and middle area, she wants to  know if she can still do mammogram?  Pt symptom been going for 2 month or more it comes and go, it's very itchy, red, little dot like pimple.   She tried Benadryl cream and neosporin, ineffective, she stated poss fungal infect.   She is looking for appt .   Appt made with MD for eval & treat.  Also pt provided central scheduling tel # for mammogram appt.               Reason for call: rash  Onset: 2 months        Reason for Disposition   Pimples (localized) and no improvement after using CARE ADVICE    Protocols used: Rash or Redness - [id number]      Future Appointments   Date Time Provider Department Center   6/19/2025  6:30 PM Jolly Jacinto DO [facility]   9/29/2025 11:30 AM Jolly Jacinto DO [facility]

## 2025-06-19 ENCOUNTER — OFFICE VISIT (OUTPATIENT)
Facility: CLINIC | Age: 55
End: 2025-06-19
Payer: COMMERCIAL

## 2025-06-19 VITALS
HEART RATE: 91 BPM | WEIGHT: 185 LBS | HEIGHT: 62 IN | SYSTOLIC BLOOD PRESSURE: 134 MMHG | DIASTOLIC BLOOD PRESSURE: 78 MMHG | OXYGEN SATURATION: 98 % | BODY MASS INDEX: 34.04 KG/M2

## 2025-06-19 DIAGNOSIS — Z00.00 ROUTINE GENERAL MEDICAL EXAMINATION AT A HEALTH CARE FACILITY: ICD-10-CM

## 2025-06-19 DIAGNOSIS — R63.5 WEIGHT GAIN: ICD-10-CM

## 2025-06-19 DIAGNOSIS — F41.9 ANXIETY: ICD-10-CM

## 2025-06-19 DIAGNOSIS — Z12.31 ENCOUNTER FOR SCREENING MAMMOGRAM FOR MALIGNANT NEOPLASM OF BREAST: ICD-10-CM

## 2025-06-19 DIAGNOSIS — L30.4 INTERTRIGO: Primary | ICD-10-CM

## 2025-06-19 DIAGNOSIS — F32.A DEPRESSION, UNSPECIFIED DEPRESSION TYPE: ICD-10-CM

## 2025-06-19 PROCEDURE — 3008F BODY MASS INDEX DOCD: CPT | Performed by: FAMILY MEDICINE

## 2025-06-19 PROCEDURE — 3078F DIAST BP <80 MM HG: CPT | Performed by: FAMILY MEDICINE

## 2025-06-19 PROCEDURE — 3075F SYST BP GE 130 - 139MM HG: CPT | Performed by: FAMILY MEDICINE

## 2025-06-19 PROCEDURE — 99214 OFFICE O/P EST MOD 30 MIN: CPT | Performed by: FAMILY MEDICINE

## 2025-06-19 RX ORDER — MONTELUKAST SODIUM 10 MG/1
10 TABLET ORAL NIGHTLY
Qty: 90 TABLET | Refills: 3 | Status: SHIPPED | OUTPATIENT
Start: 2025-06-19

## 2025-06-19 RX ORDER — CLOTRIMAZOLE AND BETAMETHASONE DIPROPIONATE 10; .64 MG/G; MG/G
1 CREAM TOPICAL 2 TIMES DAILY
Qty: 60 G | Refills: 0 | Status: SHIPPED | OUTPATIENT
Start: 2025-06-19

## 2025-06-19 NOTE — PROGRESS NOTES
CC:    Chief Complaint   Patient presents with    Rash     Rash for a couple weeks that started under her breast now moving up super itchy and burns       HPI: 54 year old female here with acute onset rash.  Has has a rash off and on under her breasts for awhile, but this time it is extremely itchy.  It was lower onto her abdomen, but now it is up the center of her breasts as well.  Has been using corn starch and using OTC Benadryl and cortisone creams. This helps slightly, but gets worse with sweating.  Trying to keep the area as dry as possible.  Has had a very stressful year as her in-laws are not doing well and she is caring for her elderly mother.   Feels more anxious and depressed than she typically is.   Has been on Zoloft for a long time, and currently on 50 mg. Has been on 100 mg in the past.     ROS:  General:  No fever, no fatigue, no weight changes  HEENT:  Denies congestion or nasal discharge  Cardio:  No chest pain  Pulmonary:  No cough, no SOB  GI:  No N/V/D  :  No discharge, no dysuria, no polyuria, no hematuria  Dermatologic: Red, itchy rash between the breasts  Psych: increased anxiety and depression     Past Medical History[1]    Social History     Socioeconomic History    Marital status:      Spouse name: Not on file    Number of children: Not on file    Years of education: Not on file    Highest education level: Not on file   Occupational History    Not on file   Tobacco Use    Smoking status: Every Day     Current packs/day: 0.50     Average packs/day: 0.5 packs/day for 21.5 years (10.7 ttl pk-yrs)     Types: Cigarettes     Start date: 1990     Last attempt to quit: 2000    Smokeless tobacco: Never   Vaping Use    Vaping status: Never Used   Substance and Sexual Activity    Alcohol use: Yes     Comment: social drinker    Drug use: No    Sexual activity: Yes     Partners: Male   Other Topics Concern    Caffeine Concern Not Asked    Exercise No    Seat Belt Not Asked    Special Diet Not  Asked    Stress Concern Yes    Weight Concern Yes    Grew up on a farm Not Asked    History of tanning Not Asked    Outdoor occupation Not Asked    Breast feeding Not Asked    Reaction to local anesthetic No    Pt has a pacemaker No    Pt has a defibrillator No   Social History Narrative    Not on file     Social Drivers of Health     Food Insecurity: No Food Insecurity (6/19/2025)    NCSS - Food Insecurity     Worried About Running Out of Food in the Last Year: No     Ran Out of Food in the Last Year: No   Transportation Needs: No Transportation Needs (6/19/2025)    NCSS - Transportation     Lack of Transportation: No   Stress: Not on file   Housing Stability: Not At Risk (6/19/2025)    NCSS - Housing/Utilities     Has Housing: Yes     Worried About Losing Housing: No     Unable to Get Utilities: No       Current Medications[2]    Aspirin      Vitals:   Vitals:    06/19/25 1821   BP: 134/78   Pulse: 91   SpO2: 98%   Weight: 185 lb (83.9 kg)   Height: 5' 2\" (1.575 m)       Body mass index is 33.84 kg/m².    Physical:  General:  Alert, appropriate, no acute distress   Breasts: No abnormal lumps or masses, no axillary lymphadenopathy, no nipple discharge or skin dimpling  Dermatologic: Sternum with clustered, erythematous papules and patches. No vesicles.     Assessment and Plan: 54-year-old female here for evaluation of worsening rash and to discuss worsening anxiety.    1. Intertrigo    - Likely candidal intertrigo, and will treat with clotrimazole-betamethasone twice daily for 2 weeks  - Continue to keep the areas clean as dry as well to promote healing, and notify me if not improving    2. Anxiety    -Given worsening anxiety and depression due to life stressors recommend increasing Zoloft to 100 mg daily  - Will notify me if needing a refill    3. Depression, unspecified depression type    - Increase Zoloft as above    4. Encounter for screening mammogram for malignant neoplasm of breast    - [facility] MICHAEL 2D+3D  SCREENING BILAT (CPT=77067/21115); Future    5. Routine general medical examination at a health care facility    - Overdue for annual labs  - Lipid Panel [E]; Future  - Hemoglobin A1C (Glycohemoglobin) [E]; Future  - Comp Metabolic Panel (14) [E]; Future  - CBC W Differential W Platelet [E]; Future    6. Weight gain    - TSH W Reflex To Free T4 [E]; Future        Jolly Jacinto DO  06/19/25  6:21 PM         [1]   Past Medical History:   Abnormal Pap smear of cervix    Allergic rhinitis    Anxiety    Asthma (HCC)    Calculus of kidney    Depression    High cholesterol    Human papilloma virus infection    Hyperlipidemia    Kidney stone    Obesity    PONV (postoperative nausea and vomiting)    Scoliosis    Screen for colon cancer    repeat CLN in 10 years    Seasonal allergies    SOB (shortness of breath)   [2]   Current Outpatient Medications   Medication Sig Dispense Refill    montelukast 10 MG Oral Tab Take 1 tablet (10 mg total) by mouth nightly. 90 tablet 1    ZOLOFT 100 MG Oral Tab Take 1 tablet (100 mg total) by mouth daily. 90 tablet 3    Multiple Vitamin (MULTI-VITAMIN) Oral Tab multivitamin tablet, [RxNorm: 0]      hydrocortisone 1 % External Cream Apply 1 Application topically 2 (two) times daily. (Patient not taking: Reported on 6/19/2025) 30 g 0

## 2025-08-01 DIAGNOSIS — R16.1 SPLENOMEGALY: ICD-10-CM

## 2025-08-06 NOTE — TELEPHONE ENCOUNTER
0913 needle to skin, 0916 R groin accessed. Protocol Failed/ No Protocol    Requested Prescriptions   Pending Prescriptions Disp Refills    MONTELUKAST 10 MG Oral Tab [Pharmacy Med Name: MONTELUKAST 10MG TABLETS] 90 tablet 0     Sig: TAKE 1 TABLET(10 MG) BY MOUTH EVERY NIGHT       Asthma & COPD Medication Protocol Failed - 3/15/2024 10:34 AM        Failed - Asthma Action Score greater than or equal to 20        Failed - Appointment in past 6 or next 3 months      Recent Outpatient Visits              4 months ago Splenomegaly    [facility] Hematology Oncology Veda Liang MD    Office Visit    4 months ago Splenomegaly    [facility] Hematology Oncology Veda Liang MD    Office Visit    5 months ago RUQ pain    [facility] Ebony Herrera APRN    Office Visit    5 months ago Celiac artery compression syndrome (HCC)    [facility] Najjar, Samer F, MD    Office Visit    8 months ago Rash and nonspecific skin eruption    [facility] Lawrence Palencia MD    Office Visit          Future Appointments         Provider Department Appt Notes    In 2 months Veda Liang MD [facility] Hematology Oncology Review labs    In 3 months Jolly Jacinto DO [facility] All labs run.               Failed - AAP/ACT given in last 12 months     No data recorded  No data recorded  No data recorded  No data recorded               Future Appointments         Provider Department Appt Notes    In 2 months Veda Liang MD [facility] Hematology Oncology Review labs    In 3 months Jolly Jacinto DO [facility] All labs run.          Recent Outpatient Visits              4 months ago Splenomegaly    [facility] Hematology Oncology Veda Liang MD    Office Visit    4 months ago Splenomegaly    [facility] Hematology Oncology  Veda Liang MD    Office Visit    5 months ago [facility]    [facility] Visit    5 months ago Celiac artery compression syndrome (HCC)    [facility] N[facility]jar, Samer F, MD    Office Visit    8 months ago Rash and nonspecific skin eruption    [facility] Palencia [facility]    Office Visit

## (undated) DIAGNOSIS — R00.0 TACHYCARDIA: ICD-10-CM

## (undated) DIAGNOSIS — R00.2 PALPITATION: Primary | ICD-10-CM

## (undated) DEVICE — TUBING SUCT L12FT DIA6MM CLR N CNDTVE W/ MAXI

## (undated) DEVICE — BITE BLK L LUMN SZ 20X27MM GRN PLAS FLX SIDE

## (undated) DEVICE — CANNULA NASAL ADULT PIGTAIL L7

## (undated) DEVICE — SYRINGE REGULAR TIP 60ML

## (undated) DEVICE — KIT CLEAN ENDOKIT 1.1OZ GOWNX2

## (undated) DEVICE — KIT ENDO ORCAPOD 160/180/190

## (undated) DEVICE — TUBING SCT CLR 6FT .25IN MDVC

## (undated) DEVICE — TUBE SUCT STD TRNSPAR RIG W/ BLB TIP RIB 5IN1

## (undated) DEVICE — FORCEPS BX L240CM DIA2.4MM L NDL RAD JAW 4

## (undated) NOTE — LETTER
COURTNEY RODRIGUEZ  [address] [address]      12/15/2022    Dear Lesli Marinelli,    It has come to our attention that a required CT CHEST test is due in January. This test was ordered by Dr. Rosangela Morfin. This test requires a prior authorization. The [facility] Department at [phone] will obtain the prior authorization and contact you when it has been approved. You can schedule the test once you receive approval notification. Please call for an appointment at [phone]. If you have any questions please contact our office at [phone].        Thank you,        [facility] Clinical Staff

## (undated) NOTE — LETTER
Date: 2024      Patient Name: Shawnee Ramirez      : 1970        Thank you for choosing [facility] as your health care provider. Your physician has deemed the following medical service(s) necessary. However, your insurance plan may not pay for all of your health care and costs and may deny payment for this service. The fact that your insurance plan does not pay for an item or service does not mean you should not receive it. The purpose of this form is to help you make an informed decision about whether or not you want to receive this service(s) that may not be paid for by your insurance plan.    CPT Code Description     Cost      ultrasound guided right trigger thumb injection w/ corticosteroid injection       _________ ______________________________ _____________      _________ ______________________________ _____________      I understand that the above mentioned service(s) or supply may not be covered by my insurance company. I agree to be financially responsible for the cost of this service or supply in the event of my insurance denies payment as a non-covered benefit.        ______________________________________________________________________  Signature of Patient or Patient's Representative  Relationship  Date    ______________________________________________________________________  Signature of Witness to signing of form   Printed Name

## (undated) NOTE — LETTER
5/19/2023              [address] [place] [address]         Dear Yann Mclean records indicate that the tests ordered for you by Galdino Ramsey, DO  have not been done. If you have, in fact, already completed the tests or you do not wish to have the tests done, please contact our office at [address] [place] [address]. Otherwise, please proceed with the testing. Enclosed is a duplicate order for your convenience.     CT CHEST    Sincerely,    DO [facility], [facility], [facility] [address]  [address] [id number]  [phone]

## (undated) NOTE — LETTER
[facility]  [address], [place], [place]  Authorization for Surgical Operation and Procedure                                                                                           I hereby authorize BIN See MD, my physician and his/her assistants (if applicable), which may include medical students, residents, and/or fellows, to perform the following surgical operation/ procedure and administer such anesthesia as may be determined necessary by my physician: Operation/Procedure name (s) COLONOSCOPY / ESOPHAGOGASTRODUODENOSCOPY on Shawnee Ramirez   2.   I recognize that during the surgical operation/procedure, unforeseen conditions may necessitate additional or different procedures than those listed above.  I, therefore, further authorize and request that the above-named surgeon, assistants, or designees perform such procedures as are, in their judgment, necessary and desirable.    3.   My surgeon/physician has discussed prior to my surgery the potential benefits, risks and side effects of this procedure; the likelihood of achieving goals; and potential problems that might occur during recuperation.  They also discussed reasonable alternatives to the procedure, including risks, benefits, and side effects related to the alternatives and risks related to not receiving this procedure.  I have had all my questions answered and I acknowledge that no guarantee has been made as to the result that may be obtained.    4.   Should the need arise during my operation/procedure, which includes change of level of care prior to discharge, I also consent to the administration of blood and/or blood products.  Further, I understand that despite careful testing and screening of blood or blood products by collecting agencies, I may still be subject to ill effects as a result of receiving a blood transfusion and/or blood products.  The following are some, but not all, of the potential risks that can  occur: fever and allergic reactions, hemolytic reactions, transmission of diseases such as Hepatitis, AIDS and Cytomegalovirus (CMV) and fluid overload.  In the event that I wish to have an autologous transfusion of my own blood, or a directed donor transfusion, I will discuss this with my physician.  Check only if Refusing Blood or Blood Products  I understand refusal of blood or blood products as deemed necessary by my physician may have serious consequences to my condition to include possible death. I hereby assume responsibility for my refusal and release the hospital, its personnel, and my physicians from any responsibility for the consequences of my refusal.    o  Refuse   5.   I authorize the use of any specimen, organs, tissues, body parts or foreign objects that may be removed from my body during the operation/procedure for diagnosis, research or teaching purposes and their subsequent disposal by hospital authorities.  I also authorize the release of specimen test results and/or written reports to my treating physician on the hospital medical staff or other referring or consulting physicians involved in my care, at the discretion of the Pathologist or my treating physician.    6.   I consent to the photographing or videotaping of the operations or procedures to be performed, including appropriate portions of my body for medical, scientific, or educational purposes, provided my identity is not revealed by the pictures or by descriptive texts accompanying them.  If the procedure has been photographed/videotaped, the surgeon will obtain the original picture, image, videotape or CD.  The hospital will not be responsible for storage, release or maintenance of the picture, image, tape or CD.    7.   I consent to the presence of a  or observers in the operating room as deemed necessary by my physician or their designees.    8.   I recognize that in the event my procedure results in extended  X-Ray/fluoroscopy time, I may develop a skin reaction.    9. If I have a Do Not Attempt Resuscitation (DNAR) order in place, that status will be suspended while in the operating room, procedural suite, and during the recovery period unless otherwise explicitly stated by me (or a person authorized to consent on my behalf). The surgeon or my attending physician will determine when the applicable recovery period ends for purposes of reinstating the DNAR order.  10. Patients having a sterilization procedure: I understand that if the procedure is successful the results will be permanent and it will therefore be impossible for me to inseminate, conceive, or bear children.  I also understand that the procedure is intended to result in sterility, although the result has not been guaranteed.   11. I acknowledge that my physician has explained sedation/analgesia administration to me including the risk and benefits I consent to the administration of sedation/analgesia as may be necessary or desirable in the judgment of my physician.    I CERTIFY THAT I HAVE READ AND FULLY UNDERSTAND THE ABOVE CONSENT TO OPERATION and/or OTHER PROCEDURE.     _________________________________________ _________________________________     ___________________________________  Signature of Patient     Signature of Responsible Person                   Printed Name of Responsible Person                              _________________________________________ ______________________________        ___________________________________  Signature of Witness         Date  Time         Relationship to Patient    STATEMENT OF PHYSICIAN My signature below affirms that prior to the time of the procedure; I have explained to the patient and/or his/her legal representative, the risks and benefits involved in the proposed treatment and any reasonable alternative to the proposed treatment. I have also explained the risks and benefits involved in refusal of the  proposed treatment and alternatives to the proposed treatment and have answered the patient's questions. If I have a significant financial interest in a co-management agreement or a significant financial interest in any product or implant, or other significant relationship used in this procedure/surgery, I have disclosed this and had a discussion with my patient.     _______________________________________________________________ _____________________________  (Signature of Physician)                                                                                         (Date)                                   (Time)  Patient Name: Shawnee Brown James    : 1970   Printed: 2024      Medical Record #: [id number]                                              Page 1 of 1

## (undated) NOTE — LETTER
9/29/2020              [address] [place] [id number]         Dear Patricio Bunn,    This letter is to inform you that our office has made several attempts to reach you by phone without success.   We were attempting to conta

## (undated) NOTE — Clinical Note
Eulogio Sequeira, I've recommended bowel mgmt, bladder diet/drill, wt loss, smoking cessation, and pelvic floor PT to [facility] to help manage her pelvic floor sx. I appreciate the opportunity to participate in her care.  Thanks, [employer]

## (undated) NOTE — LETTER
AUTHORIZATION FOR SURGICAL OPERATION OR OTHER PROCEDURE    1. I hereby authorize Dr. Panfilo Castañeda and the [facility] staff assigned to my case to perform the following operation and/or procedure at the [facility]:     ultrasound guided right trigger thumb injection w/ corticosteroid injection     2.  My physician has explained the nature and purpose of the operation or other procedure, possible alternative methods of treatment, the risks involved, and the possibility of complication to me.  I acknowledge that no guarantee has been made as to the result that may be obtained.  3.  I recognize that, during the course of this operation, or other procedure, unforseen conditions may necessitate additional or different procedure than those listed above.  I, therefore, further authorize and request that the above named physician, his/her physician assistants or designees perform such procedures as are, in his/her professional opinion, necessary and desirable.  4.  Any tissue or organs removed in the operation or other procedure may be disposed of by and at the discretion of the [facility] staff and [facility].  5.  I understand that in the event of a medical emergency, I will be transported by local paramedics to [facility] or other hospital emergency department.  6.  I certify that I have read and fully understand the above consent to operation and/or other procedure.    7.  I acknowledge that my physician has explained sedation/analgesia administration to me including the risks and benefits.  I consent to the administration of sedation/analgesia as may be necessary or desirable in the judgement of my physician.    Witness signature: ___________________________________________________ Date:  ______/______/_____                    Time:  ________ A.M.  P.M.       Patient Name:  Shawnee Ramirez  7/4/1970  [id number]'       Patient signature:   ___________________________________________________                   Statement of Physician  My signature below affirms that prior to the time of the procedure, I have explained to the patient and/or his/her guardian, the risks and benefits involved in the proposed treatment and any reasonable alternative to the proposed treatment.  I have also explained the risks and benefits involved in the refusal of the proposed treatment and have answered the patient's questions.                        Date:  ______/______/_______  Provider                      Signature:  __________________________________________________________       Time:  ___________ AMOOSE RAMOS

## (undated) NOTE — LETTER
[facility]  Administration of Anesthesia  I, Shawnee Ramirez agree to be cared for by a physician anesthesiologist alone and/or with a nurse anesthetist, who is specially trained to monitor me and give me medicine to put me to sleep or keep me comfortable during my procedure    I understand that my anesthesiologist and/or anesthetist is not an employee or agent of [facility] or [employer]. He or she works for [facility]    As the patient asking for anesthesia services, I agree to:  Allow the anesthesiologist (anesthesia doctor) to give me medicine and do additional procedures as necessary. Some examples are: Starting or using an “IV” to give me medicine, fluids or blood during my procedure, and having a breathing tube placed to help me breathe when I’m asleep (intubation). In the event that my heart stops working properly, I understand that my anesthesiologist will make every effort to sustain my life, unless otherwise directed by [facility] Do Not Resuscitate documents.  Tell my anesthesia doctor before my procedure:  If I am pregnant.  The last time that I ate or drank.  iii. All of the medicines I take (including prescriptions, herbal supplements, and pills I can buy without a prescription (including street drugs/illegal medications). Failure to inform my anesthesiologist about these medicines may increase my risk of anesthetic complications.  iv.If I am allergic to anything or have had a reaction to anesthesia before.  I understand how the anesthesia medicine will help me (benefits).  I understand that with any type of anesthesia medicine there are risks:  The most common risks are: nausea, vomiting, sore throat, muscle soreness, damage to my eyes, mouth, or teeth (from breathing tube placement).  Rare risks include: remembering what happened during my procedure, allergic reactions to medications, injury to my airway, heart, lungs, vision, nerves, or  muscles and in extremely rare instances death.  My doctor has explained to me other choices available to me for my care (alternatives).  Pregnant Patients (“epidural”):  I understand that the risks of having an epidural (medicine given into my back to help control pain during labor), include itching, low blood pressure, difficulty urinating, headache or slowing of the baby’s heart. Very rare risks include infection, bleeding, seizure, irregular heart rhythms and nerve injury.  Regional Anesthesia (“spinal”, “epidural”, & “nerve blocks”):  I understand that rare but potential complications include headache, bleeding, infection, seizure, irregular heart rhythms, and nerve injury.    _____________________________________________________________________________  Patient (or Representative) Signature/Relationship to Patient  Date   Time    _____________________________________________________________________________   Name (if used)    Language/Organization   Time    _____________________________________________________________________________  Nurse Anesthetist Signature     Date   Time  _____________________________________________________________________________  Anesthesiologist Signature     Date   Time  I have discussed the procedure and information above with the patient (or patient’s representative) and answered their questions. The patient or their representative has agreed to have anesthesia services.    _____________________________________________________________________________  Witness        Date   Time  I have verified that the signature is that of the patient or patient’s representative, and that it was signed before the procedure  Patient Name: Shawnee Ramirez     : 1970                 Printed: 2024 at 2:01 PM    Medical Record #: [id number]                                            Page 1 of 1  ----------ANESTHESIA CONSENT----------

## (undated) NOTE — LETTER
Date & Time: 1/23/2025, 2:33 PM  Patient: Shawnee Ramirez  Encounter Provider(s):    Ariadne Che APRN       To Whom It May Concern:    Shawnee Ramirez was seen and treated in our department on 1/23/2025. She should not return to work until 01/27/2025 .    If you have any questions or concerns, please do not hesitate to call.        _____________________________  Physician/APC Signature

## (undated) NOTE — ED AVS SNAPSHOT
Javier Juarez   MRN: [id number]    Department:  [facility] Emergency Department   Date of Visit:  4/20/2018           Disclosure     Insurance plans vary and the physician(s) referred by the ER may not be covered by your plan.  Please cont CARE PHYSICIAN AT ONCE OR RETURN IMMEDIATELY TO THE EMERGENCY DEPARTMENT. If you have been prescribed any medication(s), please fill your prescription right away and begin taking the medication(s) as directed.   If you believe that any of the medications

## (undated) NOTE — MR AVS SNAPSHOT
After Visit Summary   7/22/2020    Renee Iniguez    MRN: [id number]           Visit Information     Date & Time  7/22/2020 12:15 PM Provider  Malu Price, [address] [facility]  Phone [facility] now offers Video Visits through [address] for adult and pediatric patients. Video Visits are available Monday - Friday for many common conditions such as allergies, colds, cough, fever, rash, sore throat, headache and pink eye.   The cost for a Vi [address]   Monday – Friday  4:00 pm – 10:00 pm   Saturday – Sunday  10:00 am – 4:00 pm  [facility]  Emergency Medicine Providers  Conditions needing urgent attention, but are   non-life-threatening.     Also available by appointment Average cost  $120*